# Patient Record
Sex: MALE | Race: WHITE | Employment: FULL TIME | URBAN - METROPOLITAN AREA
[De-identification: names, ages, dates, MRNs, and addresses within clinical notes are randomized per-mention and may not be internally consistent; named-entity substitution may affect disease eponyms.]

---

## 2017-01-31 RX ORDER — TRIAMTERENE AND HYDROCHLOROTHIAZIDE 37.5; 25 MG/1; MG/1
CAPSULE ORAL
Qty: 90 CAPSULE | Refills: 0 | Status: SHIPPED | OUTPATIENT
Start: 2017-01-31 | End: 2017-05-01

## 2017-01-31 RX ORDER — OMEPRAZOLE 20 MG/1
CAPSULE, DELAYED RELEASE ORAL
Qty: 90 CAPSULE | Refills: 0 | Status: SHIPPED | OUTPATIENT
Start: 2017-01-31 | End: 2017-05-01

## 2017-01-31 RX ORDER — AMLODIPINE AND VALSARTAN 10; 320 MG/1; MG/1
TABLET ORAL
Qty: 90 TABLET | Refills: 0 | Status: SHIPPED | OUTPATIENT
Start: 2017-01-31 | End: 2017-05-01

## 2017-01-31 RX ORDER — ATORVASTATIN CALCIUM 20 MG/1
TABLET, FILM COATED ORAL
Qty: 90 TABLET | Refills: 0 | Status: SHIPPED | OUTPATIENT
Start: 2017-01-31 | End: 2017-07-12

## 2017-01-31 RX ORDER — MONTELUKAST SODIUM 10 MG/1
TABLET ORAL
Qty: 90 TABLET | Refills: 0 | Status: SHIPPED | OUTPATIENT
Start: 2017-01-31 | End: 2017-05-01

## 2017-01-31 NOTE — TELEPHONE ENCOUNTER
Medication: Celexa    Date of last refill: 10/26/16  [place]    Last office visit: 11/24/15  Due back to clinic per last office note:  3 months  Date next office visit scheduled:  None Scheduled.     Last OV note recom

## 2017-02-06 RX ORDER — CITALOPRAM 20 MG/1
TABLET ORAL
Qty: 30 TABLET | Refills: 0 | OUTPATIENT
Start: 2017-02-06

## 2017-02-06 NOTE — TELEPHONE ENCOUNTER
Left message for patient to give us a call back. Please help patient make an appointment. Then we can refill medication to the appointment date. Patient hasn't been seen in over a year. Patient needs to make appointment to receive refills.

## 2017-03-31 DIAGNOSIS — G47.33 OSA (OBSTRUCTIVE SLEEP APNEA): Primary | Chronic | ICD-10-CM

## 2017-03-31 RX ORDER — CITALOPRAM 20 MG/1
20 TABLET ORAL DAILY
Qty: 90 TABLET | Refills: 0 | Status: SHIPPED | OUTPATIENT
Start: 2017-03-31 | End: 2017-05-01

## 2017-03-31 NOTE — TELEPHONE ENCOUNTER
Per TE from 01/31/17:    \"Medication: Celexa    Date of last refill: 10/26/16  [place]    Last office visit: 11/24/15  Due back to clinic per last office note:  3 months  Date next office visit scheduled:  None Sched

## 2017-05-01 ENCOUNTER — OFFICE VISIT (OUTPATIENT)
Dept: NEUROLOGY | Facility: CLINIC | Age: 37
End: 2017-05-01

## 2017-05-01 VITALS
WEIGHT: 315 LBS | HEIGHT: 71 IN | DIASTOLIC BLOOD PRESSURE: 86 MMHG | BODY MASS INDEX: 44.1 KG/M2 | SYSTOLIC BLOOD PRESSURE: 110 MMHG | RESPIRATION RATE: 16 BRPM | HEART RATE: 96 BPM

## 2017-05-01 DIAGNOSIS — H53.9 VISUAL CHANGES: ICD-10-CM

## 2017-05-01 DIAGNOSIS — H53.469 HOMONYMOUS HEMIANOPSIA DUE TO OLD CEREBRAL INFARCTION: ICD-10-CM

## 2017-05-01 DIAGNOSIS — I69.398 HOMONYMOUS HEMIANOPSIA DUE TO OLD CEREBRAL INFARCTION: ICD-10-CM

## 2017-05-01 DIAGNOSIS — F41.9 ANXIETY: Chronic | ICD-10-CM

## 2017-05-01 DIAGNOSIS — E66.3 OVERWEIGHT(278.02): Chronic | ICD-10-CM

## 2017-05-01 DIAGNOSIS — I63.9 STROKE WITH CEREBRAL ISCHEMIA (HCC): ICD-10-CM

## 2017-05-01 DIAGNOSIS — G47.33 OSA (OBSTRUCTIVE SLEEP APNEA): Primary | Chronic | ICD-10-CM

## 2017-05-01 DIAGNOSIS — I72.0 ANEURYSM ARTERY, NECK (HCC): ICD-10-CM

## 2017-05-01 PROCEDURE — 99214 OFFICE O/P EST MOD 30 MIN: CPT | Performed by: OTHER

## 2017-05-01 RX ORDER — CITALOPRAM 20 MG/1
20 TABLET ORAL DAILY
Qty: 90 TABLET | Refills: 0 | Status: SHIPPED | OUTPATIENT
Start: 2017-05-01 | End: 2017-05-01

## 2017-05-01 RX ORDER — CITALOPRAM 20 MG/1
20 TABLET ORAL DAILY
Qty: 90 TABLET | Refills: 3 | Status: SHIPPED | OUTPATIENT
Start: 2017-05-01 | End: 2017-07-19

## 2017-05-01 RX ORDER — AMLODIPINE AND VALSARTAN 10; 320 MG/1; MG/1
TABLET ORAL
Qty: 90 TABLET | Refills: 0 | Status: SHIPPED | OUTPATIENT
Start: 2017-05-01 | End: 2017-11-06

## 2017-05-01 RX ORDER — MONTELUKAST SODIUM 10 MG/1
TABLET ORAL
Qty: 90 TABLET | Refills: 0 | Status: SHIPPED | OUTPATIENT
Start: 2017-05-01 | End: 2017-07-12

## 2017-05-01 RX ORDER — TRIAMTERENE AND HYDROCHLOROTHIAZIDE 37.5; 25 MG/1; MG/1
CAPSULE ORAL
Qty: 90 CAPSULE | Refills: 0 | Status: SHIPPED | OUTPATIENT
Start: 2017-05-01 | End: 2017-07-12

## 2017-05-01 RX ORDER — OMEPRAZOLE 20 MG/1
CAPSULE, DELAYED RELEASE ORAL
Qty: 90 CAPSULE | Refills: 0 | Status: SHIPPED | OUTPATIENT
Start: 2017-05-01 | End: 2017-07-12

## 2017-05-01 NOTE — PATIENT INSTRUCTIONS
Refill policies:    • Allow 2 business days for refills; controlled substances may take longer.   • Contact your pharmacy at least 5 days prior to running out of medication and have them send an electronic request or submit request through the “request re insurance carrier to obtain pre-certification or prior authorization. Unfortunately, [employer] has seen an increase in denial of payment even though the procedure/test has been pre-certified.   You are strongly encouraged to contact your insurance carrier to v

## 2017-05-01 NOTE — PROGRESS NOTES
[facility] in [place] with [facility]  Neurology - Clinic visit  2017    Arely Reyes Patient Status:  No patient class for patient encounter    1980 MRN [id number]   Location @ Rfl: 0   aspirin 325 MG Oral Tab Take 325 mg by mouth daily. Disp:  Rfl:    Albuterol Sulfate HFA (VENTOLIN) 108 (90 BASE) MCG/ACT Inhalation Aero Soln Inhale 2 puffs into the lungs every 4 (four) hours as needed.  Disp:  Rfl:    AMLODIPINE BESYLATE-VALSART XII:  Showed  visual field cut on the L side with  Homonymous L. Lower quadrianopsia,, EOMI, pupil symmetrical, light reflexes are present, fundus exam is not clearly visualized.  Face symmetrical with normal sensation, hearing normal, shoulder shrugging no hemianopsia, unchanged,   I reviewed film with him and all the test reviewed, details of discussion of risk factors control was discussed today,   Continue risk factor treatment, weight control,  mg qd PO.     he need MADELAINE.  I ordered last year, he di

## 2017-06-19 ENCOUNTER — HOSPITAL ENCOUNTER (EMERGENCY)
Facility: HOSPITAL | Age: 37
Discharge: HOME | End: 2017-06-19
Attending: EMERGENCY MEDICINE
Payer: COMMERCIAL

## 2017-06-19 ENCOUNTER — APPOINTMENT (OUTPATIENT)
Dept: GENERAL RADIOLOGY | Facility: HOSPITAL | Age: 37
End: 2017-06-19
Payer: COMMERCIAL

## 2017-06-19 VITALS
SYSTOLIC BLOOD PRESSURE: 115 MMHG | RESPIRATION RATE: 16 BRPM | DIASTOLIC BLOOD PRESSURE: 73 MMHG | HEIGHT: 71 IN | OXYGEN SATURATION: 100 % | TEMPERATURE: 98 F | BODY MASS INDEX: 44.1 KG/M2 | WEIGHT: 315 LBS | HEART RATE: 78 BPM

## 2017-06-19 DIAGNOSIS — M54.9 BACK PAIN WITHOUT RADIATION: Primary | ICD-10-CM

## 2017-06-19 PROCEDURE — 99283 EMERGENCY DEPT VISIT LOW MDM: CPT

## 2017-06-19 PROCEDURE — 96372 THER/PROPH/DIAG INJ SC/IM: CPT

## 2017-06-19 PROCEDURE — 72100 X-RAY EXAM L-S SPINE 2/3 VWS: CPT | Performed by: EMERGENCY MEDICINE

## 2017-06-19 RX ORDER — KETOROLAC TROMETHAMINE 30 MG/ML
60 INJECTION, SOLUTION INTRAMUSCULAR; INTRAVENOUS ONCE
Status: COMPLETED | OUTPATIENT
Start: 2017-06-19 | End: 2017-06-19

## 2017-06-19 RX ORDER — OXYCODONE AND ACETAMINOPHEN 10; 325 MG/1; MG/1
1 TABLET ORAL EVERY 4 HOURS PRN
Qty: 20 TABLET | Refills: 0 | Status: SHIPPED | OUTPATIENT
Start: 2017-06-19 | End: 2017-06-22

## 2017-06-19 NOTE — ED INITIAL ASSESSMENT (HPI)
Patient with lower back pain since Saturday. He went to sit down and felt extreme pain and stood up right away. He currently has pain when he tries to stand up. The pain does not radiate.

## 2017-06-19 NOTE — ED PROVIDER NOTES
Patient Seen in: [facility] Emergency Department    History   Patient presents with:  Back Pain (musculoskeletal)    Stated Complaint: back pain,     HPI    Patient presents with back pain.   The patient states that he has had back trouble off and on Father    • Hypertension Father    • Hypertension Mother    • Heart Disease Mother      CAD   • Hypertension Sister          Smoking Status: Former Smoker                   Packs/Day: 0.00  Years:           Quit date: 01/01/2001    Smokeless Status: Osmany Plata has pain when he tries  to stand up. The pain does not radiate. FINDINGS:    BONES:  Mild straightening of the lumbar lordosis. No significant spondylosis, scoliosis, fracture, or visible bony lesion.  DISC SPACES:  No significant disc narrowing tiny en

## 2017-06-21 ENCOUNTER — TELEPHONE (OUTPATIENT)
Dept: INTERNAL MEDICINE CLINIC | Facility: CLINIC | Age: 37
End: 2017-06-21

## 2017-06-21 NOTE — TELEPHONE ENCOUNTER
Future Appointments  Date Time Provider Baljit Priyanka   6/22/2017 1:45 PM Katiana Meier MD [facility] [place]

## 2017-06-21 NOTE — TELEPHONE ENCOUNTER
Patient called stating that he was in the emergency room for back pain.  He was instructed to contact PCP office and request order for MRI  Please call with update

## 2017-06-21 NOTE — TELEPHONE ENCOUNTER
Reviewed er notes  He needs to be seen prior to ordering MR  moreover documentation is necessary to get mr done  Er note does not say anything about mr

## 2017-06-22 ENCOUNTER — OFFICE VISIT (OUTPATIENT)
Dept: INTERNAL MEDICINE CLINIC | Facility: CLINIC | Age: 37
End: 2017-06-22

## 2017-06-22 VITALS
TEMPERATURE: 100 F | RESPIRATION RATE: 20 BRPM | WEIGHT: 315 LBS | SYSTOLIC BLOOD PRESSURE: 126 MMHG | BODY MASS INDEX: 42.66 KG/M2 | OXYGEN SATURATION: 99 % | DIASTOLIC BLOOD PRESSURE: 62 MMHG | HEIGHT: 72 IN | HEART RATE: 106 BPM

## 2017-06-22 DIAGNOSIS — M54.5 ACUTE MIDLINE LOW BACK PAIN, WITH SCIATICA PRESENCE UNSPECIFIED: Primary | ICD-10-CM

## 2017-06-22 PROCEDURE — 99214 OFFICE O/P EST MOD 30 MIN: CPT | Performed by: INTERNAL MEDICINE

## 2017-06-22 RX ORDER — OXYCODONE AND ACETAMINOPHEN 10; 325 MG/1; MG/1
1 TABLET ORAL EVERY 4 HOURS PRN
Qty: 50 TABLET | Refills: 0 | Status: SHIPPED | OUTPATIENT
Start: 2017-06-22 | End: 2017-07-02

## 2017-06-22 NOTE — PATIENT INSTRUCTIONS
Patient given brochure with exercise for neck ,shoulders and lumbar spine. Back Pain [Acute Or Chronic]    Back pain is usually caused by an injury to the muscles or ligaments of the spine.  Sometimes the disks that separate each bone in the spine may bulg 4. During the first two days after injury, apply an ICE PACK to the painful area for 20 minutes every 2-4 hours. This will reduce swelling and pain. HEAT (hot shower, hot bath or heating pad) works well for muscle spasm.  You can start with ice, then switch

## 2017-06-22 NOTE — PROGRESS NOTES
Rejicarolina Jurist BECERRIL 1980 is a 42year old male.     Patient presents with:  Back Pain: Started on       HPI:   C/o of low back pain for few days   Follow-up ER some better    Current Outpatient Prescriptions:  oxyCODONE-acetaminophen  MG Oral none.   Fall none. Direct trauma none. Tingling/numbness none. Previous injury none. Previous surgery none. Previous therapy none. Bowel and bladder incontinence none.    Pain scale 8/10 when moving  Increased by movement  No definite definite p FROM.   L-S spines: FROM  forward flexion 60--extension 15--lat bending 25 with spasm        ASSESSMENT AND PLAN:   There are no diagnoses linked to this encounter. There are no Patient Instructions on file for this visit.    The patient indicates under

## 2017-06-29 RX ORDER — AMLODIPINE AND VALSARTAN 10; 320 MG/1; MG/1
TABLET ORAL
Qty: 30 TABLET | Refills: 0 | Status: SHIPPED | OUTPATIENT
Start: 2017-06-29 | End: 2017-07-19

## 2017-07-12 NOTE — TELEPHONE ENCOUNTER
Pt called triage line and LM for nurse stating he was in need of refills, but did not mention which medications needed refills. I called pt back and LMTCB. Awaiting call to assist pt.

## 2017-07-12 NOTE — TELEPHONE ENCOUNTER
Pharmacy has changed pharmacy to [facility] order. [facility].   Pt states has enough meds to last approximately 1 week before needing to fill    ATORVASTATIN CALCIUM 20 MG   TRIAMTERENE-HCTZ 37.5-25 MG   OMEPRAZOLE 20 MG Oral Cap

## 2017-07-13 ENCOUNTER — TELEPHONE (OUTPATIENT)
Dept: INTERNAL MEDICINE CLINIC | Facility: CLINIC | Age: 37
End: 2017-07-13

## 2017-07-13 DIAGNOSIS — I10 ESSENTIAL HYPERTENSION: Chronic | ICD-10-CM

## 2017-07-13 DIAGNOSIS — Z00.00 BLOOD TESTS FOR ROUTINE GENERAL PHYSICAL EXAMINATION: Primary | ICD-10-CM

## 2017-07-13 DIAGNOSIS — E11.9 TYPE 2 DIABETES MELLITUS WITHOUT COMPLICATION, UNSPECIFIED LONG TERM INSULIN USE STATUS: ICD-10-CM

## 2017-07-13 RX ORDER — OMEPRAZOLE 20 MG/1
CAPSULE, DELAYED RELEASE ORAL
Qty: 90 CAPSULE | Refills: 0 | Status: SHIPPED | OUTPATIENT
Start: 2017-07-13 | End: 2017-10-28

## 2017-07-13 RX ORDER — MONTELUKAST SODIUM 10 MG/1
TABLET ORAL
Qty: 90 TABLET | Refills: 0 | Status: SHIPPED | OUTPATIENT
Start: 2017-07-13 | End: 2017-10-28

## 2017-07-13 RX ORDER — ATORVASTATIN CALCIUM 20 MG/1
TABLET, FILM COATED ORAL
Qty: 90 TABLET | Refills: 0 | Status: SHIPPED | OUTPATIENT
Start: 2017-07-13 | End: 2017-10-28

## 2017-07-13 RX ORDER — TRIAMTERENE AND HYDROCHLOROTHIAZIDE 37.5; 25 MG/1; MG/1
CAPSULE ORAL
Qty: 90 CAPSULE | Refills: 0 | Status: SHIPPED | OUTPATIENT
Start: 2017-07-13 | End: 2017-10-28

## 2017-07-19 NOTE — TELEPHONE ENCOUNTER
Pt does not meet refill protocol for amlodipine/valsartan  since he is due for cmp[/bmp.     Medication pending, ok to refill?

## 2017-07-19 NOTE — TELEPHONE ENCOUNTER
Received fax from [facility] order--new pharmacy for patient.   Medication already approved for 1 year on 05/01/17    Citalopram 20 mg  Refilled 05/1/17 #90 with 3 refills    Dr. Eagle Sanders notes 5/1/17  He has R. CVA, in R.  occipital or temporal stroke:

## 2017-07-20 RX ORDER — AMLODIPINE AND VALSARTAN 10; 320 MG/1; MG/1
1 TABLET ORAL
Qty: 90 TABLET | Refills: 0 | Status: SHIPPED | OUTPATIENT
Start: 2017-07-20 | End: 2017-10-28

## 2017-10-28 DIAGNOSIS — I10 ESSENTIAL HYPERTENSION: Chronic | ICD-10-CM

## 2017-10-28 DIAGNOSIS — Z00.00 BLOOD TESTS FOR ROUTINE GENERAL PHYSICAL EXAMINATION: Primary | ICD-10-CM

## 2017-10-28 DIAGNOSIS — E11.9 TYPE 2 DIABETES MELLITUS WITHOUT COMPLICATION, UNSPECIFIED LONG TERM INSULIN USE STATUS: ICD-10-CM

## 2017-10-28 DIAGNOSIS — E78.00 PURE HYPERCHOLESTEROLEMIA: Chronic | ICD-10-CM

## 2017-10-30 NOTE — TELEPHONE ENCOUNTER
Pt needs CPX as well as blood work. Called pt and left voicemail to call back so we can fill RX as soon as possible. Lab work is ordered already.

## 2017-10-31 ENCOUNTER — TELEPHONE (OUTPATIENT)
Dept: INTERNAL MEDICINE CLINIC | Facility: CLINIC | Age: 37
End: 2017-10-31

## 2017-10-31 RX ORDER — AMLODIPINE AND VALSARTAN 10; 320 MG/1; MG/1
1 TABLET ORAL
Qty: 30 TABLET | Refills: 0 | Status: SHIPPED | OUTPATIENT
Start: 2017-10-31 | End: 2017-11-06

## 2017-10-31 RX ORDER — ATORVASTATIN CALCIUM 20 MG/1
TABLET, FILM COATED ORAL
Qty: 30 TABLET | Refills: 0 | Status: SHIPPED | OUTPATIENT
Start: 2017-10-31 | End: 2017-11-06

## 2017-10-31 RX ORDER — TRIAMTERENE AND HYDROCHLOROTHIAZIDE 37.5; 25 MG/1; MG/1
CAPSULE ORAL
Qty: 30 CAPSULE | Refills: 0 | Status: SHIPPED | OUTPATIENT
Start: 2017-10-31 | End: 2017-11-06 | Stop reason: ALTCHOICE

## 2017-10-31 RX ORDER — OMEPRAZOLE 20 MG/1
CAPSULE, DELAYED RELEASE ORAL
Qty: 30 CAPSULE | Refills: 0 | Status: SHIPPED | OUTPATIENT
Start: 2017-10-31 | End: 2018-06-15

## 2017-10-31 RX ORDER — MONTELUKAST SODIUM 10 MG/1
TABLET ORAL
Qty: 30 TABLET | Refills: 0 | Status: SHIPPED | OUTPATIENT
Start: 2017-10-31 | End: 2017-11-06

## 2017-10-31 NOTE — TELEPHONE ENCOUNTER
PT returned call from yesterday and today about scheduling CPX and getting blood work done as require for physical and per protocol in order to refill RXs. Pt did schedule however, began to yell at me stating we were holding his medications from him.  I did

## 2017-10-31 NOTE — TELEPHONE ENCOUNTER
Per protocol pt scheduled appt, and will complete blood work prior to appt. 30 day supply given and sent to [facility]. See additional TE for more information.

## 2017-11-03 ENCOUNTER — LAB ENCOUNTER (OUTPATIENT)
Dept: LAB | Age: 37
End: 2017-11-03
Attending: INTERNAL MEDICINE
Payer: COMMERCIAL

## 2017-11-03 DIAGNOSIS — E11.9 TYPE 2 DIABETES MELLITUS WITHOUT COMPLICATION, UNSPECIFIED LONG TERM INSULIN USE STATUS: ICD-10-CM

## 2017-11-03 DIAGNOSIS — E78.00 PURE HYPERCHOLESTEROLEMIA: Chronic | ICD-10-CM

## 2017-11-03 DIAGNOSIS — N20.0 KIDNEY STONES: ICD-10-CM

## 2017-11-03 DIAGNOSIS — I10 ESSENTIAL HYPERTENSION: ICD-10-CM

## 2017-11-03 DIAGNOSIS — Z00.00 BLOOD TESTS FOR ROUTINE GENERAL PHYSICAL EXAMINATION: ICD-10-CM

## 2017-11-03 PROCEDURE — 83036 HEMOGLOBIN GLYCOSYLATED A1C: CPT | Performed by: INTERNAL MEDICINE

## 2017-11-03 PROCEDURE — 84436 ASSAY OF TOTAL THYROXINE: CPT | Performed by: INTERNAL MEDICINE

## 2017-11-03 PROCEDURE — 36415 COLL VENOUS BLD VENIPUNCTURE: CPT | Performed by: INTERNAL MEDICINE

## 2017-11-03 PROCEDURE — 82570 ASSAY OF URINE CREATININE: CPT | Performed by: INTERNAL MEDICINE

## 2017-11-03 PROCEDURE — 82043 UR ALBUMIN QUANTITATIVE: CPT | Performed by: INTERNAL MEDICINE

## 2017-11-03 PROCEDURE — 81003 URINALYSIS AUTO W/O SCOPE: CPT | Performed by: INTERNAL MEDICINE

## 2017-11-03 PROCEDURE — 84550 ASSAY OF BLOOD/URIC ACID: CPT | Performed by: INTERNAL MEDICINE

## 2017-11-03 PROCEDURE — 80050 GENERAL HEALTH PANEL: CPT | Performed by: INTERNAL MEDICINE

## 2017-11-03 PROCEDURE — 80061 LIPID PANEL: CPT | Performed by: INTERNAL MEDICINE

## 2017-11-06 ENCOUNTER — OFFICE VISIT (OUTPATIENT)
Dept: INTERNAL MEDICINE CLINIC | Facility: CLINIC | Age: 37
End: 2017-11-06

## 2017-11-06 VITALS
HEIGHT: 71 IN | HEART RATE: 84 BPM | TEMPERATURE: 100 F | BODY MASS INDEX: 44.1 KG/M2 | SYSTOLIC BLOOD PRESSURE: 110 MMHG | OXYGEN SATURATION: 95 % | DIASTOLIC BLOOD PRESSURE: 84 MMHG | WEIGHT: 315 LBS | RESPIRATION RATE: 16 BRPM

## 2017-11-06 DIAGNOSIS — G44.219 EPISODIC TENSION-TYPE HEADACHE, NOT INTRACTABLE: Chronic | ICD-10-CM

## 2017-11-06 DIAGNOSIS — R74.8 ABNORMAL LEVELS OF OTHER SERUM ENZYMES: ICD-10-CM

## 2017-11-06 DIAGNOSIS — E11.9 TYPE 2 DIABETES MELLITUS WITHOUT COMPLICATION, UNSPECIFIED LONG TERM INSULIN USE STATUS: Primary | ICD-10-CM

## 2017-11-06 DIAGNOSIS — I10 ESSENTIAL HYPERTENSION: Chronic | ICD-10-CM

## 2017-11-06 PROCEDURE — 99214 OFFICE O/P EST MOD 30 MIN: CPT | Performed by: INTERNAL MEDICINE

## 2017-11-06 RX ORDER — MONTELUKAST SODIUM 10 MG/1
10 TABLET ORAL
Qty: 90 TABLET | Refills: 3 | Status: SHIPPED | OUTPATIENT
Start: 2017-11-06 | End: 2018-09-16

## 2017-11-06 RX ORDER — ATORVASTATIN CALCIUM 20 MG/1
20 TABLET, FILM COATED ORAL DAILY
Qty: 90 TABLET | Refills: 3 | Status: SHIPPED | OUTPATIENT
Start: 2017-11-06 | End: 2018-09-16

## 2017-11-06 RX ORDER — OMEPRAZOLE 20 MG/1
20 CAPSULE, DELAYED RELEASE ORAL
Qty: 30 CAPSULE | Refills: 0 | Status: CANCELLED | OUTPATIENT
Start: 2017-11-06

## 2017-11-06 RX ORDER — AMLODIPINE AND VALSARTAN 10; 320 MG/1; MG/1
TABLET ORAL
Qty: 90 TABLET | Refills: 3 | Status: SHIPPED | OUTPATIENT
Start: 2017-11-06 | End: 2018-09-16

## 2017-11-06 NOTE — PROGRESS NOTES
Pete Hanks  1980 is a 42year old male.     Patient presents with:  Physical: ESt Pt complete physical      HPI:   DIABETES MELLITUS:   The diet that the patient has been following is none  The frequency of the monitoring schedule is none  The res 10-12 drinks/weekends-not for past few               weeks       REVIEW OF SYSTEMS:     General/Constitutional:   Weight loss  Yes   Ophthalmologic:   Change in vision none. Diminished vision no. Double vision no. Vision loss no.   Endocrine:   Excessive sw enzymes    Other orders  -     Cancel: omeprazole 20 MG Oral Capsule Delayed Release; Take 1 capsule (20 mg total) by mouth once daily.  -     Montelukast Sodium 10 MG Oral Tab; Take 1 tablet (10 mg total) by mouth once daily.          Liver enzymes most li

## 2017-11-18 ENCOUNTER — TELEPHONE (OUTPATIENT)
Dept: INTERNAL MEDICINE CLINIC | Facility: CLINIC | Age: 37
End: 2017-11-18

## 2017-12-04 RX ORDER — TRIAMTERENE AND HYDROCHLOROTHIAZIDE 37.5; 25 MG/1; MG/1
CAPSULE ORAL
Qty: 90 CAPSULE | Refills: 0 | OUTPATIENT
Start: 2017-12-04

## 2017-12-04 RX ORDER — OMEPRAZOLE 20 MG/1
CAPSULE, DELAYED RELEASE ORAL
Qty: 90 CAPSULE | Refills: 0 | OUTPATIENT
Start: 2017-12-04

## 2017-12-28 ENCOUNTER — TELEPHONE (OUTPATIENT)
Dept: INTERNAL MEDICINE CLINIC | Facility: CLINIC | Age: 37
End: 2017-12-28

## 2017-12-28 RX ORDER — TRIAMTERENE AND HYDROCHLOROTHIAZIDE 37.5; 25 MG/1; MG/1
CAPSULE ORAL
Qty: 90 CAPSULE | Refills: 0 | Status: SHIPPED | OUTPATIENT
Start: 2017-12-28 | End: 2018-06-15

## 2017-12-28 RX ORDER — OMEPRAZOLE 20 MG/1
CAPSULE, DELAYED RELEASE ORAL
Qty: 90 CAPSULE | Refills: 0 | Status: SHIPPED | OUTPATIENT
Start: 2017-12-28 | End: 2018-03-29

## 2017-12-28 NOTE — TELEPHONE ENCOUNTER
Pt called for status of his refill . D/t numourous issues with customer service a request was made to [facility] supervisor for her to contact pt.

## 2017-12-29 NOTE — TELEPHONE ENCOUNTER
Triamterene passed protocol. Requesting Triamterene-hctz 37.5-25mg   And   omeprazole  LOV: 11/6/17  RTC: 1 week  Last Relevant Labs: 11/3/17  Filled: Triamterene last sent as 30 with no refills on 10/31/17.     Omeprazole - last sent as 30 with no refi

## 2017-12-29 NOTE — TELEPHONE ENCOUNTER
Spoke with pt on Thursday 12/28/17 at approximately 6:15pm.   Pt asking about his refills for the triamterene - hctz and the omeprazole. See refill request from 12/25/17 - refills addressed.      Clinic error made on 11/6/17 at pt's OV and med was accid

## 2018-01-02 ENCOUNTER — CHARTING TRANS (OUTPATIENT)
Dept: OTHER | Age: 38
End: 2018-01-02

## 2018-01-02 ENCOUNTER — LAB SERVICES (OUTPATIENT)
Dept: OTHER | Age: 38
End: 2018-01-02

## 2018-01-02 LAB — RAPID STREP GROUP A: NORMAL

## 2018-03-21 ENCOUNTER — APPOINTMENT (OUTPATIENT)
Dept: CT IMAGING | Age: 38
End: 2018-03-21
Attending: FAMILY MEDICINE
Payer: COMMERCIAL

## 2018-03-21 ENCOUNTER — HOSPITAL ENCOUNTER (OUTPATIENT)
Age: 38
Discharge: HOME | End: 2018-03-21
Attending: FAMILY MEDICINE
Payer: COMMERCIAL

## 2018-03-21 VITALS
OXYGEN SATURATION: 95 % | TEMPERATURE: 99 F | HEART RATE: 90 BPM | RESPIRATION RATE: 16 BRPM | DIASTOLIC BLOOD PRESSURE: 82 MMHG | SYSTOLIC BLOOD PRESSURE: 128 MMHG

## 2018-03-21 DIAGNOSIS — N20.0 NEPHROLITHIASIS: Primary | ICD-10-CM

## 2018-03-21 LAB
POCT BILIRUBIN URINE: NEGATIVE
POCT GLUCOSE URINE: NEGATIVE MG/DL
POCT KETONE URINE: NEGATIVE MG/DL
POCT LEUKOCYTE ESTERASE URINE: NEGATIVE
POCT NITRITE URINE: NEGATIVE
POCT PH URINE: 7.5 (ref 5–8)
POCT SPECIFIC GRAVITY URINE: 1.02
POCT UROBILINOGEN URINE: 0.2 MG/DL

## 2018-03-21 PROCEDURE — 81002 URINALYSIS NONAUTO W/O SCOPE: CPT | Performed by: FAMILY MEDICINE

## 2018-03-21 PROCEDURE — 74176 CT ABD & PELVIS W/O CONTRAST: CPT | Performed by: FAMILY MEDICINE

## 2018-03-21 PROCEDURE — 99214 OFFICE O/P EST MOD 30 MIN: CPT

## 2018-03-21 RX ORDER — HYDROCODONE BITARTRATE AND ACETAMINOPHEN 10; 325 MG/1; MG/1
1-2 TABLET ORAL EVERY 4 HOURS PRN
Qty: 20 TABLET | Refills: 0 | Status: SHIPPED | OUTPATIENT
Start: 2018-03-21 | End: 2018-03-28

## 2018-03-21 RX ORDER — ONDANSETRON 4 MG/1
4 TABLET, ORALLY DISINTEGRATING ORAL EVERY 4 HOURS PRN
Qty: 10 TABLET | Refills: 0 | Status: SHIPPED | OUTPATIENT
Start: 2018-03-21 | End: 2018-03-28

## 2018-03-21 RX ORDER — NAPROXEN 500 MG/1
500 TABLET ORAL 2 TIMES DAILY PRN
Qty: 20 TABLET | Refills: 0 | Status: SHIPPED | OUTPATIENT
Start: 2018-03-21 | End: 2018-03-28

## 2018-03-21 NOTE — ED PROVIDER NOTES
Patient Seen in: [facility]    History   Patient presents with:  Urinary Symptoms (urologic)    Stated Complaint: blood in urine / back issues    HPI    64-year-old gentleman with a history of hypertension, GERD, and nephrolithi McBurney's, Neg Rovsing, Neg Obturator, Neg Psoas, Neg Heel Strike. No CVAT to percussion Can  Neuro: A&O x3. No focal deficits. Vascular: Peripheral pulses intact.   No edema    ED Course     Labs Reviewed   POCT URINALYSIS DIPSTICK - Abnormal; Notable fo am    Follow-up:  Ralph Pitts MD  [address], [facility] [address]  [address] [facility] [phone]    Call   As needed    Shad Atwood MD  [address] [place] [address] [address].  [phone]    Schedule an appointment as soon as possible for a

## 2018-03-21 NOTE — ED INITIAL ASSESSMENT (HPI)
Pt with hx of kidney stones  Noted blood in urine this morning. Some urgency. Possible vague tenderness left flank.

## 2018-03-29 PROCEDURE — 87086 URINE CULTURE/COLONY COUNT: CPT | Performed by: PHYSICIAN ASSISTANT

## 2018-03-29 PROCEDURE — 88108 CYTOPATH CONCENTRATE TECH: CPT | Performed by: PHYSICIAN ASSISTANT

## 2018-04-02 ENCOUNTER — LAB ENCOUNTER (OUTPATIENT)
Dept: LAB | Age: 38
End: 2018-04-02
Attending: PHYSICIAN ASSISTANT
Payer: COMMERCIAL

## 2018-04-02 DIAGNOSIS — R31.0 GROSS HEMATURIA: ICD-10-CM

## 2018-04-02 PROCEDURE — 36415 COLL VENOUS BLD VENIPUNCTURE: CPT

## 2018-04-02 PROCEDURE — 84520 ASSAY OF UREA NITROGEN: CPT

## 2018-04-02 PROCEDURE — 82565 ASSAY OF CREATININE: CPT

## 2018-04-02 NOTE — PROGRESS NOTES
Cr wnl  Okay to proceed with CT    4/3/2018   3:40 PM    [facility] PET CT/CT          RCK[facility]          [facility] AT [facility]  4/16/2018  9:30 AM    Nimisha Hernandez MD          NPV [facility] NAMAN Larson

## 2018-04-06 NOTE — TELEPHONE ENCOUNTER
I have called and spoke to the patient. He state doctor has prescribed this to him years ago for exercise dose asthma. Patient state that he is going to [facility] and is concern of the high altitude and my have difficult ing breathing.  Patient was

## 2018-04-06 NOTE — TELEPHONE ENCOUNTER
Pt is going out of town tomorrow and would like a refill on his inhaler dose not know what its called and needs it to be refilled today  [address], [address] [facility] 6, [phone], [phone]

## 2018-04-07 RX ORDER — ALBUTEROL SULFATE 90 UG/1
2 AEROSOL, METERED RESPIRATORY (INHALATION) EVERY 4 HOURS PRN
Qty: 1 INHALER | Refills: 1 | Status: SHIPPED | OUTPATIENT
Start: 2018-04-07 | End: 2019-01-03

## 2018-05-18 DIAGNOSIS — F41.9 ANXIETY: Chronic | ICD-10-CM

## 2018-05-18 DIAGNOSIS — G47.33 OSA (OBSTRUCTIVE SLEEP APNEA): Chronic | ICD-10-CM

## 2018-05-18 NOTE — TELEPHONE ENCOUNTER
Left message on patient identified voicemail (ok with [employer]IP[employer] consent) informing patient  Needs appointment for further refills.   .  Medication: Celexa    Date of last refill: 7/9/17  [place]    Last office visit: 5/1/1

## 2018-05-21 NOTE — TELEPHONE ENCOUNTER
Meghana Pitts at pharmacy calling for update on Rx. Informed her that we are waiting on call back from pt to set up appt. Verbalized understanding.

## 2018-05-23 ENCOUNTER — HOSPITAL ENCOUNTER (OUTPATIENT)
Age: 38
Discharge: HOME | End: 2018-05-23
Payer: COMMERCIAL

## 2018-05-23 VITALS
TEMPERATURE: 98 F | OXYGEN SATURATION: 97 % | RESPIRATION RATE: 16 BRPM | SYSTOLIC BLOOD PRESSURE: 140 MMHG | HEART RATE: 98 BPM | DIASTOLIC BLOOD PRESSURE: 90 MMHG

## 2018-05-23 DIAGNOSIS — H60.311 ACUTE DIFFUSE OTITIS EXTERNA OF RIGHT EAR: Primary | ICD-10-CM

## 2018-05-23 PROCEDURE — 99214 OFFICE O/P EST MOD 30 MIN: CPT

## 2018-05-23 PROCEDURE — 99213 OFFICE O/P EST LOW 20 MIN: CPT

## 2018-05-23 RX ORDER — NEOMYCIN SULFATE, POLYMYXIN B SULFATE AND HYDROCORTISONE 10; 3.5; 1 MG/ML; MG/ML; [USP'U]/ML
3 SUSPENSION/ DROPS AURICULAR (OTIC) 3 TIMES DAILY
Qty: 1 BOTTLE | Refills: 0 | Status: SHIPPED | OUTPATIENT
Start: 2018-05-23 | End: 2018-05-30

## 2018-05-24 NOTE — ED PROVIDER NOTES
Patient Seen in: [facility] In [facility]    History   Patient presents with:  Ear Pain    Stated Complaint: EAR PAIN    78-year-old male who presents to the immediate care with complaints of right ear pain for the past couple days.   Patient stated complaint: EAR PAIN  Other systems are as noted in HPI. Constitutional and vital signs reviewed. All other systems reviewed and negative except as noted above.     Physical Exam   ED Triage Vitals [05/23/18 2008]  BP: 140/90  Pulse: 98  Resp: 1  discharge instructions and plan. I answered all of the patient's questions prior to discharge.              Disposition and Plan     Clinical Impression:  Acute diffuse otitis externa of right ear  (primary encounter diagnosis)    Disposition:  Discharge

## 2018-05-25 ENCOUNTER — OFFICE VISIT (OUTPATIENT)
Dept: INTERNAL MEDICINE CLINIC | Facility: CLINIC | Age: 38
End: 2018-05-25

## 2018-05-25 VITALS
OXYGEN SATURATION: 96 % | BODY MASS INDEX: 56 KG/M2 | RESPIRATION RATE: 16 BRPM | HEART RATE: 82 BPM | WEIGHT: 315 LBS | SYSTOLIC BLOOD PRESSURE: 118 MMHG | DIASTOLIC BLOOD PRESSURE: 80 MMHG

## 2018-05-25 DIAGNOSIS — H66.90 ACUTE OTITIS MEDIA, UNSPECIFIED OTITIS MEDIA TYPE: Primary | ICD-10-CM

## 2018-05-25 DIAGNOSIS — H60.501 ACUTE OTITIS EXTERNA OF RIGHT EAR, UNSPECIFIED TYPE: ICD-10-CM

## 2018-05-25 PROCEDURE — 99213 OFFICE O/P EST LOW 20 MIN: CPT | Performed by: INTERNAL MEDICINE

## 2018-05-25 RX ORDER — HYDROCODONE BITARTRATE AND ACETAMINOPHEN 7.5; 325 MG/1; MG/1
1 TABLET ORAL EVERY 4 HOURS PRN
Qty: 15 TABLET | Refills: 0 | Status: SHIPPED | OUTPATIENT
Start: 2018-05-25 | End: 2018-05-30

## 2018-05-25 RX ORDER — AMOXICILLIN AND CLAVULANATE POTASSIUM 875; 125 MG/1; MG/1
1 TABLET, FILM COATED ORAL 2 TIMES DAILY
Qty: 20 TABLET | Refills: 0 | Status: SHIPPED | OUTPATIENT
Start: 2018-05-25 | End: 2018-06-04

## 2018-05-25 NOTE — PROGRESS NOTES
Ana Luisa Diego  1980 is a 34year old male who presents for upper respiratory symptoms    Patient presents with:  Pain: right ear pain        HPI:   Immediate care. f/u   Still quite tender in the right ear no relief pain scale 8.        Current Outp Packs/day: 0.00      Years: 0.00         Quit date: 1/1/2001  Smokeless tobacco: Current User                       Types: Chew  Comment: -chews tobacco daily  Alcohol use: Yes           0.0 oz/week     Comment: 10-12 drinks/weekends-not fo 5/29/2018). Елена Kingsley MD

## 2018-05-29 RX ORDER — CITALOPRAM 20 MG/1
TABLET ORAL
Qty: 90 TABLET | Refills: 0 | Status: SHIPPED | OUTPATIENT
Start: 2018-05-29 | End: 2018-06-26

## 2018-05-29 NOTE — TELEPHONE ENCOUNTER
Patient scheduled appointment for 6/26/2018 @ 2pm with Cole Moraes. He is on his way out of town and would like the medication called into [facility].   [place], [address]

## 2018-06-15 RX ORDER — OMEPRAZOLE 20 MG/1
20 CAPSULE, DELAYED RELEASE ORAL DAILY
Qty: 90 CAPSULE | Refills: 0 | Status: SHIPPED | OUTPATIENT
Start: 2018-06-15 | End: 2018-09-16

## 2018-06-15 RX ORDER — TRIAMTERENE AND HYDROCHLOROTHIAZIDE 37.5; 25 MG/1; MG/1
1 CAPSULE ORAL EVERY MORNING
Qty: 90 CAPSULE | Refills: 0 | Status: SHIPPED | OUTPATIENT
Start: 2018-06-15 | End: 2018-09-16

## 2018-06-15 NOTE — TELEPHONE ENCOUNTER
Refill requested: Omeprazole 20 mg + Triamterene HCTZ 37.5-25 mg     Failed protocol - omeprazole   Passed protocol - triamterene       Last refill: 12/28.17 Triamterene #90 NR + 10/31/17 Omeprazole 20 mg #30 NR     Relevant Labs: CMP 11/3/17    BP Reading

## 2018-06-26 ENCOUNTER — OFFICE VISIT (OUTPATIENT)
Dept: NEUROLOGY | Facility: CLINIC | Age: 38
End: 2018-06-26

## 2018-06-26 VITALS
SYSTOLIC BLOOD PRESSURE: 110 MMHG | DIASTOLIC BLOOD PRESSURE: 80 MMHG | BODY MASS INDEX: 56 KG/M2 | WEIGHT: 315 LBS | HEART RATE: 96 BPM

## 2018-06-26 DIAGNOSIS — F41.9 ANXIETY: Chronic | ICD-10-CM

## 2018-06-26 DIAGNOSIS — I63.9 STROKE WITH CEREBRAL ISCHEMIA (HCC): Primary | ICD-10-CM

## 2018-06-26 DIAGNOSIS — G47.33 OSA (OBSTRUCTIVE SLEEP APNEA): Chronic | ICD-10-CM

## 2018-06-26 DIAGNOSIS — G44.029 CHRONIC CLUSTER HEADACHE, NOT INTRACTABLE: Chronic | ICD-10-CM

## 2018-06-26 PROCEDURE — 99214 OFFICE O/P EST MOD 30 MIN: CPT | Performed by: OTHER

## 2018-06-26 RX ORDER — CITALOPRAM 20 MG/1
20 TABLET ORAL
Qty: 90 TABLET | Refills: 3 | Status: SHIPPED | OUTPATIENT
Start: 2018-06-26 | End: 2019-01-03

## 2018-06-26 NOTE — PROGRESS NOTES
[facility] in [place] with [facility]  Neurology - Clinic visit  2018    Lawson Sam Patient Status:  No patient class for patient encounter    1980 MRN [id number]   Location @EN 37.5-25 MG Oral Cap Take 1 capsule by mouth every morning. Disp: 90 capsule Rfl: 0   Albuterol Sulfate  (90 Base) MCG/ACT Inhalation Aero Soln Inhale 2 puffs into the lungs every 4 (four) hours as needed.  Disp: 1 Inhaler Rfl: 1   atorvastatin 20 MG pleasant,   CN from II to XII:  Showed  visual field cut on the L side with  Homonymous L. Lower quadrianopsia,, EOMI, pupil symmetrical, light reflexes are present, fundus exam is not clearly visualized.  Face symmetrical with normal sensation, hearing nor do with MHS twice, but he declined another script. He does have risk of stroke, new DM, HTN, obesity, risk factor discussed. 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

## 2018-06-26 NOTE — PATIENT INSTRUCTIONS
Refill policies:    • Allow 2-3 business days for refills; controlled substances may take longer.   • Contact your pharmacy at least 5 days prior to running out of medication and have them send an electronic request or submit request through the “request re entire amount billed. Precertification and Prior Authorizations: If your physician has recommended that you have a procedure or additional testing performed.   [facility]) will contact your insurance carrier to obtain pre-certi

## 2018-08-21 ENCOUNTER — TELEPHONE (OUTPATIENT)
Dept: INTERNAL MEDICINE CLINIC | Facility: CLINIC | Age: 38
End: 2018-08-21

## 2018-08-21 NOTE — TELEPHONE ENCOUNTER
Patient's spouse called regarding patient. Stated that she is unable to deal with his depression as he is not willing to seek help.  Arun Mtz would like further recommendation    **Spouse noted that it is VERY important that this matter is not directly discuss

## 2018-09-04 DIAGNOSIS — F41.9 ANXIETY: Chronic | ICD-10-CM

## 2018-09-04 DIAGNOSIS — G47.33 OSA (OBSTRUCTIVE SLEEP APNEA): Chronic | ICD-10-CM

## 2018-09-04 RX ORDER — CITALOPRAM 20 MG/1
TABLET ORAL
Qty: 90 TABLET | Refills: 2 | Status: SHIPPED | OUTPATIENT
Start: 2018-09-04 | End: 2019-04-21

## 2018-09-04 NOTE — TELEPHONE ENCOUNTER
Wants Rx changed to mail order pharmacy, contacted [facility] to verify patient picked up first prescription, spoke with Junior Nassar, she states patient did  1 out of the 4 refills, also notified Junior Nassar to cancel current Rx as patient will now use

## 2018-09-16 DIAGNOSIS — E11.9 TYPE 2 DIABETES MELLITUS WITHOUT COMPLICATION (HCC): ICD-10-CM

## 2018-09-16 DIAGNOSIS — G44.219 EPISODIC TENSION-TYPE HEADACHE, NOT INTRACTABLE: Chronic | ICD-10-CM

## 2018-09-16 DIAGNOSIS — I10 ESSENTIAL HYPERTENSION: Chronic | ICD-10-CM

## 2018-09-17 NOTE — TELEPHONE ENCOUNTER
1st attempt to contact patient on cell phone - Phillip Tate is full and was unable to leave message.

## 2018-09-17 NOTE — TELEPHONE ENCOUNTER
Protocol failed for Montelukast, Atorvastatin and Metformin    Medication(s) to Refill:   Requested Prescriptions     Pending Prescriptions Disp Refills   • ATORVASTATIN 20 MG Oral Tab [Pharmacy Med Name: ATORVASTATIN 20MG TABLETS] 90 tablet 0     Sig: ADITI ((4.0 - Albumin) x 0.8 + Calcium     Note: Calculation is only valid when Albumin is less than 4.0g/dL.     Alkaline Phosphatase 45 - 117 U/L 80    AST 15 - 41 U/L 57 Abnormally high     Alt 17 - 63 U/L 144 Abnormally high     Bilirubin, Total 0.1 - 2.0 mg/ average   23.99         11.04             Three times average      Non HDL Chol <130 mg/dL 77    Comment:    Reference ranges for non-HDL-C are based on National      Cholesterol Education III Guidelines:      Desirable:         < 130 mg/dL      Borderline

## 2018-09-19 RX ORDER — TRIAMTERENE AND HYDROCHLOROTHIAZIDE 37.5; 25 MG/1; MG/1
CAPSULE ORAL
Qty: 90 CAPSULE | Refills: 0 | Status: SHIPPED | OUTPATIENT
Start: 2018-09-19 | End: 2018-12-05

## 2018-09-19 RX ORDER — MONTELUKAST SODIUM 10 MG/1
TABLET ORAL
Qty: 90 TABLET | Refills: 0 | Status: SHIPPED | OUTPATIENT
Start: 2018-09-19 | End: 2019-01-03

## 2018-09-19 RX ORDER — AMLODIPINE AND VALSARTAN 10; 320 MG/1; MG/1
TABLET ORAL
Qty: 90 TABLET | Refills: 0 | Status: SHIPPED | OUTPATIENT
Start: 2018-09-19 | End: 2018-11-26

## 2018-09-19 RX ORDER — ATORVASTATIN CALCIUM 20 MG/1
TABLET, FILM COATED ORAL
Qty: 90 TABLET | Refills: 0 | Status: SHIPPED | OUTPATIENT
Start: 2018-09-19 | End: 2019-01-03

## 2018-09-19 RX ORDER — OMEPRAZOLE 20 MG/1
CAPSULE, DELAYED RELEASE ORAL
Qty: 90 CAPSULE | Refills: 0 | Status: SHIPPED | OUTPATIENT
Start: 2018-09-19 | End: 2018-12-05

## 2018-11-02 VITALS
TEMPERATURE: 99.1 F | HEART RATE: 92 BPM | HEIGHT: 71 IN | BODY MASS INDEX: 44.1 KG/M2 | OXYGEN SATURATION: 96 % | RESPIRATION RATE: 18 BRPM | WEIGHT: 315 LBS

## 2018-11-23 DIAGNOSIS — E11.9 TYPE 2 DIABETES MELLITUS WITHOUT COMPLICATION, WITHOUT LONG-TERM CURRENT USE OF INSULIN (HCC): ICD-10-CM

## 2018-11-23 DIAGNOSIS — Z00.00 ROUTINE GENERAL MEDICAL EXAMINATION AT A HEALTH CARE FACILITY: Primary | ICD-10-CM

## 2018-11-23 DIAGNOSIS — I10 ESSENTIAL HYPERTENSION: Chronic | ICD-10-CM

## 2018-11-26 DIAGNOSIS — I10 ESSENTIAL HYPERTENSION: Chronic | ICD-10-CM

## 2018-11-26 NOTE — TELEPHONE ENCOUNTER
Protocol failed - Patient due for labs and CPX    Medication(s) to Refill:   Requested Prescriptions     Pending Prescriptions Disp Refills   • AMLODIPINE BESYLATE-VALSARTAN  MG Oral Tab [Pharmacy Med Name: AMLODIPINE-VALSARTAN 10-320MG TABS] 90 tabl

## 2018-11-27 RX ORDER — AMLODIPINE AND VALSARTAN 10; 320 MG/1; MG/1
1 TABLET ORAL DAILY
Qty: 90 TABLET | Refills: 0 | Status: SHIPPED | OUTPATIENT
Start: 2018-11-27 | End: 2019-01-03

## 2018-11-27 RX ORDER — AMLODIPINE AND VALSARTAN 10; 320 MG/1; MG/1
1 TABLET ORAL DAILY
Qty: 30 TABLET | Refills: 0 | Status: SHIPPED | OUTPATIENT
Start: 2018-11-27 | End: 2019-01-03

## 2018-11-27 NOTE — TELEPHONE ENCOUNTER
Your appointments     Date & Time Appointment Department [facility])    Jan 03, 2019 10:00 AM CST Physical - Established Patient with Zheng Novoa MD [address] [place]        [employer]

## 2018-11-27 NOTE — TELEPHONE ENCOUNTER
LMTCB x 1   Due for OV    Refill requested:   Requested Prescriptions     Pending Prescriptions Disp Refills   • Amlodipine Besylate-Valsartan  MG Oral Tab [Pharmacy Med Name: AMLODIPINE-VALSARTAN 10-320MG TABS] 90 tablet 0     Sig: Take 1 tablet by

## 2018-11-30 NOTE — TELEPHONE ENCOUNTER
Patient has CPX in January -     Future Appointments   Date Time Provider Baljit Mathew   1/3/2019 10:00 AM Ralph Pitts MD [facility] [place]

## 2018-12-01 RX ORDER — AMLODIPINE AND VALSARTAN 10; 320 MG/1; MG/1
TABLET ORAL
Qty: 90 TABLET | Refills: 0 | Status: SHIPPED | OUTPATIENT
Start: 2018-12-01 | End: 2019-06-25

## 2018-12-05 RX ORDER — TRIAMTERENE AND HYDROCHLOROTHIAZIDE 37.5; 25 MG/1; MG/1
CAPSULE ORAL
Qty: 90 CAPSULE | Refills: 0 | Status: SHIPPED | OUTPATIENT
Start: 2018-12-05 | End: 2019-03-17

## 2018-12-05 RX ORDER — OMEPRAZOLE 20 MG/1
CAPSULE, DELAYED RELEASE ORAL
Qty: 90 CAPSULE | Refills: 0 | Status: SHIPPED | OUTPATIENT
Start: 2018-12-05 | End: 2019-03-17

## 2018-12-27 NOTE — LETTER
06/10/21        Ashley Chamberlain  [address] [facility] [address]      Dear Katty Sepulveda,    [facility] records indicate that you have outstanding lab work and or testing that was ordered for you and has not yet been completed:  CARD ECHO 2D DOPPLER (CPT=
Adequate: hears normal conversation without difficulty

## 2019-01-03 ENCOUNTER — OFFICE VISIT (OUTPATIENT)
Dept: INTERNAL MEDICINE CLINIC | Facility: CLINIC | Age: 39
End: 2019-01-03
Payer: COMMERCIAL

## 2019-01-03 ENCOUNTER — LAB ENCOUNTER (OUTPATIENT)
Dept: LAB | Age: 39
End: 2019-01-03
Attending: INTERNAL MEDICINE
Payer: COMMERCIAL

## 2019-01-03 VITALS
RESPIRATION RATE: 16 BRPM | TEMPERATURE: 99 F | DIASTOLIC BLOOD PRESSURE: 88 MMHG | OXYGEN SATURATION: 96 % | HEIGHT: 71.5 IN | SYSTOLIC BLOOD PRESSURE: 120 MMHG | HEART RATE: 107 BPM | BODY MASS INDEX: 43.13 KG/M2 | WEIGHT: 315 LBS

## 2019-01-03 DIAGNOSIS — Z00.00 ROUTINE GENERAL MEDICAL EXAMINATION AT A HEALTH CARE FACILITY: Primary | ICD-10-CM

## 2019-01-03 DIAGNOSIS — E11.9 TYPE 2 DIABETES MELLITUS WITHOUT COMPLICATION (HCC): ICD-10-CM

## 2019-01-03 DIAGNOSIS — G44.219 EPISODIC TENSION-TYPE HEADACHE, NOT INTRACTABLE: Chronic | ICD-10-CM

## 2019-01-03 DIAGNOSIS — G47.33 OSA (OBSTRUCTIVE SLEEP APNEA): ICD-10-CM

## 2019-01-03 DIAGNOSIS — L98.9 SKIN LESION: ICD-10-CM

## 2019-01-03 DIAGNOSIS — E11.9 TYPE 2 DIABETES MELLITUS WITHOUT COMPLICATION, WITHOUT LONG-TERM CURRENT USE OF INSULIN (HCC): ICD-10-CM

## 2019-01-03 DIAGNOSIS — Z00.00 ROUTINE GENERAL MEDICAL EXAMINATION AT A HEALTH CARE FACILITY: ICD-10-CM

## 2019-01-03 LAB
ALBUMIN SERPL-MCNC: 3.7 G/DL (ref 3.1–4.5)
ALBUMIN/GLOB SERPL: 0.9 {RATIO} (ref 1–2)
ALP LIVER SERPL-CCNC: 75 U/L (ref 45–117)
ALT SERPL-CCNC: 97 U/L (ref 17–63)
ANION GAP SERPL CALC-SCNC: 8 MMOL/L (ref 0–18)
AST SERPL-CCNC: 38 U/L (ref 15–41)
BASOPHILS # BLD AUTO: 0.08 X10(3) UL (ref 0–0.1)
BASOPHILS NFR BLD AUTO: 1.1 %
BILIRUB SERPL-MCNC: 0.4 MG/DL (ref 0.1–2)
BILIRUB UR QL STRIP.AUTO: NEGATIVE
BUN BLD-MCNC: 13 MG/DL (ref 8–20)
BUN/CREAT SERPL: 14.8 (ref 10–20)
CALCIUM BLD-MCNC: 9.2 MG/DL (ref 8.3–10.3)
CHLORIDE SERPL-SCNC: 103 MMOL/L (ref 101–111)
CHOLEST SMN-MCNC: 138 MG/DL (ref ?–200)
CLARITY UR REFRACT.AUTO: CLEAR
CO2 SERPL-SCNC: 26 MMOL/L (ref 22–32)
COLOR UR AUTO: YELLOW
CREAT BLD-MCNC: 0.88 MG/DL (ref 0.7–1.3)
CREAT UR-SCNC: 128 MG/DL
EOSINOPHIL # BLD AUTO: 0.11 X10(3) UL (ref 0–0.3)
EOSINOPHIL NFR BLD AUTO: 1.5 %
ERYTHROCYTE [DISTWIDTH] IN BLOOD BY AUTOMATED COUNT: 13 % (ref 11.5–16)
EST. AVERAGE GLUCOSE BLD GHB EST-MCNC: 266 MG/DL (ref 68–126)
GLOBULIN PLAS-MCNC: 4 G/DL (ref 2.8–4.4)
GLUCOSE BLD-MCNC: 327 MG/DL (ref 70–99)
GLUCOSE UR STRIP.AUTO-MCNC: >=500 MG/DL
HBA1C MFR BLD HPLC: 10.9 % (ref ?–5.7)
HCT VFR BLD AUTO: 44.6 % (ref 37–53)
HDLC SERPL-MCNC: 35 MG/DL (ref 40–59)
HGB BLD-MCNC: 15.2 G/DL (ref 13–17)
IMMATURE GRANULOCYTE COUNT: 0.02 X10(3) UL (ref 0–1)
IMMATURE GRANULOCYTE RATIO %: 0.3 %
INSULIN: 25 MU/L (ref 1.7–31)
LDLC SERPL CALC-MCNC: 69 MG/DL (ref ?–100)
LEUKOCYTE ESTERASE UR QL STRIP.AUTO: NEGATIVE
LYMPHOCYTES # BLD AUTO: 2.74 X10(3) UL (ref 0.9–4)
LYMPHOCYTES NFR BLD AUTO: 36.7 %
M PROTEIN MFR SERPL ELPH: 7.7 G/DL (ref 6.4–8.2)
MCH RBC QN AUTO: 30.2 PG (ref 27–33.2)
MCHC RBC AUTO-ENTMCNC: 34.1 G/DL (ref 31–37)
MCV RBC AUTO: 88.5 FL (ref 80–99)
MICROALBUMIN UR-MCNC: 5.99 MG/DL
MICROALBUMIN/CREAT 24H UR-RTO: 46.8 UG/MG (ref ?–30)
MONOCYTES # BLD AUTO: 0.51 X10(3) UL (ref 0.1–1)
MONOCYTES NFR BLD AUTO: 6.8 %
NEUTROPHIL ABS PRELIM: 4 X10 (3) UL (ref 1.3–6.7)
NEUTROPHILS # BLD AUTO: 4 X10(3) UL (ref 1.3–6.7)
NEUTROPHILS NFR BLD AUTO: 53.6 %
NITRITE UR QL STRIP.AUTO: NEGATIVE
NONHDLC SERPL-MCNC: 103 MG/DL (ref ?–130)
OSMOLALITY SERPL CALC.SUM OF ELEC: 297 MOSM/KG (ref 275–295)
PH UR STRIP.AUTO: 5 [PH] (ref 4.5–8)
PLATELET # BLD AUTO: 223 10(3)UL (ref 150–450)
POTASSIUM SERPL-SCNC: 4 MMOL/L (ref 3.6–5.1)
PROT UR STRIP.AUTO-MCNC: NEGATIVE MG/DL
RBC # BLD AUTO: 5.04 X10(6)UL (ref 4.3–5.7)
RBC UR QL AUTO: NEGATIVE
RED CELL DISTRIBUTION WIDTH-SD: 41.3 FL (ref 35.1–46.3)
SODIUM SERPL-SCNC: 137 MMOL/L (ref 136–144)
SP GR UR STRIP.AUTO: 1.03 (ref 1–1.03)
T4 SERPL-MCNC: 8.5 UG/DL (ref 4.5–10.9)
TRIGL SERPL-MCNC: 168 MG/DL (ref 30–149)
TSI SER-ACNC: 1.96 MIU/ML (ref 0.35–5.5)
UROBILINOGEN UR STRIP.AUTO-MCNC: <2 MG/DL
VLDLC SERPL CALC-MCNC: 34 MG/DL (ref 0–30)
WBC # BLD AUTO: 7.5 X10(3) UL (ref 4–13)

## 2019-01-03 PROCEDURE — 83525 ASSAY OF INSULIN: CPT | Performed by: INTERNAL MEDICINE

## 2019-01-03 PROCEDURE — 93000 ELECTROCARDIOGRAM COMPLETE: CPT | Performed by: INTERNAL MEDICINE

## 2019-01-03 PROCEDURE — 99396 PREV VISIT EST AGE 40-64: CPT | Performed by: INTERNAL MEDICINE

## 2019-01-03 PROCEDURE — 83036 HEMOGLOBIN GLYCOSYLATED A1C: CPT | Performed by: INTERNAL MEDICINE

## 2019-01-03 PROCEDURE — 80050 GENERAL HEALTH PANEL: CPT | Performed by: INTERNAL MEDICINE

## 2019-01-03 PROCEDURE — 99213 OFFICE O/P EST LOW 20 MIN: CPT | Performed by: INTERNAL MEDICINE

## 2019-01-03 PROCEDURE — 36415 COLL VENOUS BLD VENIPUNCTURE: CPT | Performed by: INTERNAL MEDICINE

## 2019-01-03 PROCEDURE — 80061 LIPID PANEL: CPT | Performed by: INTERNAL MEDICINE

## 2019-01-03 PROCEDURE — 82570 ASSAY OF URINE CREATININE: CPT | Performed by: INTERNAL MEDICINE

## 2019-01-03 PROCEDURE — 82043 UR ALBUMIN QUANTITATIVE: CPT | Performed by: INTERNAL MEDICINE

## 2019-01-03 PROCEDURE — 81003 URINALYSIS AUTO W/O SCOPE: CPT | Performed by: INTERNAL MEDICINE

## 2019-01-03 PROCEDURE — 84436 ASSAY OF TOTAL THYROXINE: CPT | Performed by: INTERNAL MEDICINE

## 2019-01-03 RX ORDER — ATORVASTATIN CALCIUM 20 MG/1
20 TABLET, FILM COATED ORAL
Qty: 90 TABLET | Refills: 0 | Status: SHIPPED | OUTPATIENT
Start: 2019-01-03 | End: 2019-07-24

## 2019-01-03 RX ORDER — ALBUTEROL SULFATE 90 UG/1
2 AEROSOL, METERED RESPIRATORY (INHALATION) EVERY 4 HOURS PRN
Qty: 1 INHALER | Refills: 1 | Status: SHIPPED | OUTPATIENT
Start: 2019-01-03 | End: 2019-01-09

## 2019-01-03 RX ORDER — MONTELUKAST SODIUM 10 MG/1
10 TABLET ORAL
Qty: 90 TABLET | Refills: 0 | Status: SHIPPED | OUTPATIENT
Start: 2019-01-03 | End: 2019-03-22

## 2019-01-03 NOTE — PROGRESS NOTES
Rossi Castelan [facility] 1980 is a 34year old male. Patient presents with:  Physical      HPI:   No new cc    Current Outpatient Medications:  atorvastatin 20 MG Oral Tab Take 1 tablet (20 mg total) by mouth once daily.  Disp: 90 tablet Rfl: 0   MetFORMIN drainage. Ears no earaches, no fullness, normal hearing, no tinnitus. Nose and Sinuses no recurrent colds, no stuffiness, no discharge, no hay fever, no nosebleeds, no sinus trouble. Mouth and Pharynx no sore throats, no hoarseness.  Neck no lumps, no goite Insomnia none/Memory loss none.         Hx  of obstructive sleep apnea last sleep study done over 10 years ago feels he is not getting restful sleep and often getting up tired in the morning      EXAM:   /88   Pulse 107   Temp 98.7 °F (37.1 °C) (Oral) modalities normal.   LYMPHATICS:   Cervical: none. Groin: no adenopathy . Inguinal: no adenopathy. Supraclavicular: none.    DERMATOLOGY:   Rash: no. 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   Sonya Pires MD

## 2019-01-08 NOTE — MR AVS SNAPSHOT
[address], [facility] [address] [place] [address]               Thank you for choosing us for your health care visit with Riki Trinidad MD.  We are glad to serve you and happy to provide you with this summary authorize routine medications on weekends. ? No narcotics or controlled substances are refilled after noon on Fridays or by on call physicians. ? By law, narcotics cannot be faxed or phoned into your pharmacy.  The prescription must be signed by the provi diligence, the insurance carrier gives the disclaimer that \"Although the procedure is authorized, this does not guarantee payment. \"    Ultimately the patient is responsible for payment. Thank you for your understanding in the matter.            Fran Carlie.tn None

## 2019-01-09 RX ORDER — ALBUTEROL SULFATE 90 UG/1
2 AEROSOL, METERED RESPIRATORY (INHALATION) EVERY 4 HOURS PRN
Qty: 3 INHALER | Refills: 0 | Status: SHIPPED | OUTPATIENT
Start: 2019-01-09 | End: 2019-03-17

## 2019-03-18 RX ORDER — TRIAMTERENE AND HYDROCHLOROTHIAZIDE 37.5; 25 MG/1; MG/1
CAPSULE ORAL
Qty: 90 CAPSULE | Refills: 0 | Status: SHIPPED | OUTPATIENT
Start: 2019-03-18 | End: 2019-06-05

## 2019-03-18 RX ORDER — OMEPRAZOLE 20 MG/1
CAPSULE, DELAYED RELEASE ORAL
Qty: 90 CAPSULE | Refills: 0 | Status: SHIPPED | OUTPATIENT
Start: 2019-03-18 | End: 2019-06-05

## 2019-03-18 NOTE — TELEPHONE ENCOUNTER
Protocol failed for Ventolin - No AAP/ACT  Protocol passed for Triamterene     Medication(s) to Refill:   Requested Prescriptions     Pending Prescriptions Disp Refills   • OMEPRAZOLE 20 MG Oral Capsule Delayed Release [Pharmacy Med Name: OMEPRAZOLE 20MG C

## 2019-03-23 RX ORDER — MONTELUKAST SODIUM 10 MG/1
TABLET ORAL
Qty: 90 TABLET | Refills: 0 | Status: SHIPPED | OUTPATIENT
Start: 2019-03-23 | End: 2019-06-11

## 2019-03-23 NOTE — TELEPHONE ENCOUNTER
Refill requested:   Requested Prescriptions     Pending Prescriptions Disp Refills   • MONTELUKAST SODIUM 10 MG Oral Tab [Pharmacy Med Name: MONTELUKAST 10MG TABLETS] 90 tablet 0     Sig: TAKE 1 TABLET BY MOUTH ONCE DAILY       Failed protocol    Asthma &

## 2019-04-21 DIAGNOSIS — F41.9 ANXIETY: Chronic | ICD-10-CM

## 2019-04-21 DIAGNOSIS — G47.33 OSA (OBSTRUCTIVE SLEEP APNEA): Chronic | ICD-10-CM

## 2019-04-22 RX ORDER — CITALOPRAM 20 MG/1
TABLET ORAL
Qty: 90 TABLET | Refills: 0 | Status: SHIPPED | OUTPATIENT
Start: 2019-04-22

## 2019-04-22 NOTE — TELEPHONE ENCOUNTER
Medication: CITALOPRAM HYDROBROMIDE 20 MG Oral Tab    Date of last refill: 09/04/18 (#90/2)  [place]    Last office visit: 06/26/18  Due back to clinic per last office note:  Around 06/26/19  Date next office visit s

## 2019-05-21 ENCOUNTER — TELEPHONE (OUTPATIENT)
Dept: NEUROLOGY | Facility: CLINIC | Age: 39
End: 2019-05-21

## 2019-05-21 ENCOUNTER — TELEPHONE (OUTPATIENT)
Dept: INTERNAL MEDICINE CLINIC | Facility: CLINIC | Age: 39
End: 2019-05-21

## 2019-05-21 NOTE — TELEPHONE ENCOUNTER
Called Shreya with [employer] back and she wanted to verify pt taking metformin 1000 mg bid for DM. Advised that pt was supposed to follow up but he has yet to do so after 5 attempts to reach him. She stated she would attempt to reach out to him as well.    Called julieta

## 2019-06-03 DIAGNOSIS — G44.219 EPISODIC TENSION-TYPE HEADACHE, NOT INTRACTABLE: Chronic | ICD-10-CM

## 2019-06-03 DIAGNOSIS — E11.9 TYPE 2 DIABETES MELLITUS WITHOUT COMPLICATION (HCC): ICD-10-CM

## 2019-06-04 NOTE — TELEPHONE ENCOUNTER
Last OV: 1/3/19 with Dr. Chapin Wang   Last refill date: 1/3/19 (Metformin 1000mg)    3/18/19 (Ventolin Inhaler)    #/refills: #180, 0 refills (Metformin 1000mg)    #54g, 0 refills (Ventolin Inhaler)     When pt was asked to return for OV: 2 weeks  Upcoming ap

## 2019-06-05 RX ORDER — TRIAMTERENE AND HYDROCHLOROTHIAZIDE 37.5; 25 MG/1; MG/1
CAPSULE ORAL
Qty: 90 CAPSULE | Refills: 0 | Status: SHIPPED | OUTPATIENT
Start: 2019-06-05 | End: 2019-08-17

## 2019-06-05 RX ORDER — OMEPRAZOLE 20 MG/1
CAPSULE, DELAYED RELEASE ORAL
Qty: 90 CAPSULE | Refills: 0 | Status: SHIPPED | OUTPATIENT
Start: 2019-06-05 | End: 2019-08-17

## 2019-06-11 RX ORDER — MONTELUKAST SODIUM 10 MG/1
TABLET ORAL
Qty: 90 TABLET | Refills: 0 | Status: SHIPPED | OUTPATIENT
Start: 2019-06-11 | End: 2019-08-17

## 2019-06-11 NOTE — TELEPHONE ENCOUNTER
Refill requested:   Requested Prescriptions     Pending Prescriptions Disp Refills   • MONTELUKAST SODIUM 10 MG Oral Tab [Pharmacy Med Name: MONTELUKAST 10MG TABLETS] 90 tablet 0     Sig: TAKE 1 TABLET BY MOUTH ONCE DAILY       Failed protocol  Asthma [facility]

## 2019-06-12 DIAGNOSIS — E11.9 TYPE 2 DIABETES MELLITUS WITHOUT COMPLICATION (HCC): ICD-10-CM

## 2019-06-12 DIAGNOSIS — G44.219 EPISODIC TENSION-TYPE HEADACHE, NOT INTRACTABLE: Chronic | ICD-10-CM

## 2019-06-14 NOTE — TELEPHONE ENCOUNTER
Failed protocol     Last refill:  6/4/2019 54 g NR [facility]  6/4/2019 Metformin 1000 mg #180 [facility]    A1C/Microalbumin - 1/3/2019     LOV:   1/3/2019 Dr Jose Rey RTC 2 weeks     No FOV scheduled

## 2019-06-17 ENCOUNTER — TELEPHONE (OUTPATIENT)
Dept: INTERNAL MEDICINE CLINIC | Facility: CLINIC | Age: 39
End: 2019-06-17

## 2019-06-17 NOTE — TELEPHONE ENCOUNTER
Shreya care coordinator from [facility] is calling to speak with a nurse to discuss the patient's compliance issues. Please advise.

## 2019-06-24 DIAGNOSIS — G44.219 EPISODIC TENSION-TYPE HEADACHE, NOT INTRACTABLE: Chronic | ICD-10-CM

## 2019-06-24 DIAGNOSIS — E11.9 TYPE 2 DIABETES MELLITUS WITHOUT COMPLICATION (HCC): ICD-10-CM

## 2019-06-24 DIAGNOSIS — I10 ESSENTIAL HYPERTENSION: Chronic | ICD-10-CM

## 2019-06-24 NOTE — TELEPHONE ENCOUNTER
Patient would like Metformin refilled to local pharmacy [facility] for 30 day he is completely out; he still has not received replacement RX for AMLODIPINE BESYLATE-VALSARTAN  MG Oral Tab and is also out.   Patient scheduled AUgust appointment wi

## 2019-06-25 RX ORDER — AMLODIPINE AND VALSARTAN 10; 320 MG/1; MG/1
1 TABLET ORAL
Qty: 90 TABLET | Refills: 0 | Status: SHIPPED | OUTPATIENT
Start: 2019-06-25 | End: 2019-07-09

## 2019-06-25 RX ORDER — AMLODIPINE AND VALSARTAN 10; 320 MG/1; MG/1
1 TABLET ORAL
Qty: 90 TABLET | Refills: 0 | Status: SHIPPED | OUTPATIENT
Start: 2019-06-25 | End: 2019-06-25

## 2019-06-25 NOTE — TELEPHONE ENCOUNTER
Protocol failed - Last HgBA1C < 7.5    Medication(s) to Refill:   Requested Prescriptions     Pending Prescriptions Disp Refills   • amLODIPine Besylate-Valsartan  MG Oral Tab 90 tablet 0     Sig: Take 1 tablet by mouth once daily.    • metFORMIN HCl

## 2019-06-25 NOTE — TELEPHONE ENCOUNTER
Medications sent to incorrect pharmacy - error has been corrected and [address]     [address] [phone], [phone]

## 2019-07-08 DIAGNOSIS — F41.9 ANXIETY: Chronic | ICD-10-CM

## 2019-07-08 DIAGNOSIS — G47.33 OSA (OBSTRUCTIVE SLEEP APNEA): Chronic | ICD-10-CM

## 2019-07-08 NOTE — TELEPHONE ENCOUNTER
[facility] Dr Rizwan Mendoza before refill can be processed.       Medication: CITALOPRAM HYDROBROMIDE 20 MG    Date of last refill: 4/22/19 (#90/0)  [place]     Last office visit: Visit date not found  Due b

## 2019-07-09 ENCOUNTER — TELEPHONE (OUTPATIENT)
Dept: INTERNAL MEDICINE CLINIC | Facility: CLINIC | Age: 39
End: 2019-07-09

## 2019-07-09 RX ORDER — AMLODIPINE AND VALSARTAN 10; 320 MG/1; MG/1
1 TABLET ORAL
Qty: 90 TABLET | Refills: 0 | Status: SHIPPED | OUTPATIENT
Start: 2019-07-09 | End: 2019-08-17

## 2019-07-09 NOTE — TELEPHONE ENCOUNTER
Pt calling in, stated he will need his full remainder prescription for[de-identified] amLODIPine Besylate-Valsartan  MG Oral Tab  metFORMIN HCl 1000 MG Oral Tab    To be sent to: [facility], [address]

## 2019-07-09 NOTE — TELEPHONE ENCOUNTER
Incorrect qty sent to pt - now needs short supply sent to [address] [facility] in [facility] due to going out of town.     Future Appointments   Date Time Provider Baljit Mathew   8/17/2019  9:00 AM Philly Brown MD [facility] [place]     Spoke with pt a

## 2019-07-22 ENCOUNTER — TELEPHONE (OUTPATIENT)
Dept: INTERNAL MEDICINE CLINIC | Facility: CLINIC | Age: 39
End: 2019-07-22

## 2019-07-22 DIAGNOSIS — E11.9 TYPE 2 DIABETES MELLITUS WITHOUT COMPLICATION (HCC): ICD-10-CM

## 2019-07-23 NOTE — TELEPHONE ENCOUNTER
Atorastatin 20 mg    Last OV relevant to medication: 1-3-2019     Last refill date: 1-3-2019     When pt was asked to return for OV:2 weeks     Upcoming appt/reason: 8-     Labs:    1/3/19 10:50 AM    Cholesterol, Total <200 mg/dL 138

## 2019-07-24 RX ORDER — ATORVASTATIN CALCIUM 20 MG/1
20 TABLET, FILM COATED ORAL
Qty: 90 TABLET | Refills: 0 | Status: SHIPPED | OUTPATIENT
Start: 2019-07-24 | End: 2019-07-25

## 2019-07-25 ENCOUNTER — TELEPHONE (OUTPATIENT)
Dept: INTERNAL MEDICINE CLINIC | Facility: CLINIC | Age: 39
End: 2019-07-25

## 2019-07-25 RX ORDER — ATORVASTATIN CALCIUM 20 MG/1
20 TABLET, FILM COATED ORAL
Qty: 90 TABLET | Refills: 0 | Status: SHIPPED | OUTPATIENT
Start: 2019-07-25 | End: 2019-07-25

## 2019-07-25 RX ORDER — ATORVASTATIN CALCIUM 20 MG/1
20 TABLET, FILM COATED ORAL
Qty: 30 TABLET | Refills: 0 | Status: SHIPPED | OUTPATIENT
Start: 2019-07-25 | End: 2019-08-17

## 2019-07-25 NOTE — TELEPHONE ENCOUNTER
Please call patient back he is being notified by [facility] that his 30 day Atorastatin is ready for  at [facility]; he expected Mail Order to be filling a 90 day refill as always.  Please advise if he should p/u 30 day or if we can expedite 90 day ref

## 2019-07-25 NOTE — TELEPHONE ENCOUNTER
Medication ordered for #90 no refills. Medication reordered and sent to mail order pharmacy. Pt notified and is requesting a short supply to be sent to his local pharmacy d/t not having enough medication to last until rx gets in.    Medication sent to

## 2019-08-17 ENCOUNTER — OFFICE VISIT (OUTPATIENT)
Dept: INTERNAL MEDICINE CLINIC | Facility: CLINIC | Age: 39
End: 2019-08-17
Payer: COMMERCIAL

## 2019-08-17 VITALS
WEIGHT: 315 LBS | DIASTOLIC BLOOD PRESSURE: 86 MMHG | TEMPERATURE: 99 F | HEART RATE: 89 BPM | HEIGHT: 71.5 IN | BODY MASS INDEX: 43.13 KG/M2 | SYSTOLIC BLOOD PRESSURE: 120 MMHG | RESPIRATION RATE: 16 BRPM | OXYGEN SATURATION: 97 %

## 2019-08-17 DIAGNOSIS — E78.00 PURE HYPERCHOLESTEROLEMIA: Primary | Chronic | ICD-10-CM

## 2019-08-17 DIAGNOSIS — I10 ESSENTIAL HYPERTENSION: Chronic | ICD-10-CM

## 2019-08-17 DIAGNOSIS — E11.9 TYPE 2 DIABETES MELLITUS WITHOUT COMPLICATION (HCC): ICD-10-CM

## 2019-08-17 PROBLEM — H53.469 HOMONYMOUS HEMIANOPSIA DUE TO OLD CEREBRAL INFARCTION: Chronic | Status: ACTIVE | Noted: 2017-05-01

## 2019-08-17 PROBLEM — I69.398 HOMONYMOUS HEMIANOPSIA DUE TO OLD CEREBRAL INFARCTION: Chronic | Status: ACTIVE | Noted: 2017-05-01

## 2019-08-17 PROCEDURE — 99213 OFFICE O/P EST LOW 20 MIN: CPT | Performed by: INTERNAL MEDICINE

## 2019-08-17 RX ORDER — ATORVASTATIN CALCIUM 20 MG/1
20 TABLET, FILM COATED ORAL
Qty: 90 TABLET | Refills: 1 | Status: SHIPPED | OUTPATIENT
Start: 2019-08-17 | End: 2020-02-24

## 2019-08-17 RX ORDER — OMEPRAZOLE 20 MG/1
20 CAPSULE, DELAYED RELEASE ORAL
Qty: 90 CAPSULE | Refills: 1 | Status: SHIPPED | OUTPATIENT
Start: 2019-08-17 | End: 2019-11-22

## 2019-08-17 RX ORDER — TRIAMTERENE AND HYDROCHLOROTHIAZIDE 37.5; 25 MG/1; MG/1
1 CAPSULE ORAL EVERY MORNING
Qty: 90 CAPSULE | Refills: 1 | Status: SHIPPED | OUTPATIENT
Start: 2019-08-17 | End: 2019-11-21

## 2019-08-17 RX ORDER — AMLODIPINE AND VALSARTAN 10; 320 MG/1; MG/1
1 TABLET ORAL
Qty: 90 TABLET | Refills: 0 | Status: SHIPPED | OUTPATIENT
Start: 2019-08-17 | End: 2020-02-12

## 2019-08-17 RX ORDER — MONTELUKAST SODIUM 10 MG/1
10 TABLET ORAL
Qty: 90 TABLET | Refills: 1 | Status: SHIPPED | OUTPATIENT
Start: 2019-08-17 | End: 2019-11-22

## 2019-08-17 NOTE — PATIENT INSTRUCTIONS
In a CT calcium scoring of the heart in view of his multiple risk factors  Await blood work prior to recommendations

## 2019-08-17 NOTE — PROGRESS NOTES
Mallika Angeles  1980 is a 37year old male. Patient presents with: Follow - Up       HPI:   Here for routine check.   Denies any specific complain except some occasional itchiness of the anterior chest more on the inside    Current Outpatient Medi extremities no. Dizziness no. Dyspnea on exertion none. Fainting none. Fatigue no. High blood pressure on medication(s). Irregular heart beat no. Leg edema no. Murmurs no. Orthopnea no.       EXAM:   /86 (BP Location: Left arm, Patient Position: Sitti in view of his multiple risk factors  Await blood work prior to recommendations       The patient indicates understanding of these issues and agrees to the plan. The patient is asked to Return in about 3 months (around 11/17/2019). Alejandra Dotson MD

## 2019-08-20 RX ORDER — CITALOPRAM 20 MG/1
TABLET ORAL
Qty: 90 TABLET | Refills: 0 | OUTPATIENT
Start: 2019-08-20

## 2019-11-05 ENCOUNTER — HOSPITAL ENCOUNTER (OUTPATIENT)
Age: 39
Discharge: HOME | End: 2019-11-05
Attending: FAMILY MEDICINE
Payer: COMMERCIAL

## 2019-11-05 ENCOUNTER — APPOINTMENT (OUTPATIENT)
Dept: GENERAL RADIOLOGY | Age: 39
End: 2019-11-05
Attending: FAMILY MEDICINE
Payer: COMMERCIAL

## 2019-11-05 VITALS
OXYGEN SATURATION: 98 % | HEART RATE: 88 BPM | DIASTOLIC BLOOD PRESSURE: 82 MMHG | TEMPERATURE: 98 F | SYSTOLIC BLOOD PRESSURE: 126 MMHG | RESPIRATION RATE: 20 BRPM

## 2019-11-05 DIAGNOSIS — J40 BRONCHITIS: ICD-10-CM

## 2019-11-05 DIAGNOSIS — J01.90 ACUTE NON-RECURRENT SINUSITIS, UNSPECIFIED LOCATION: Primary | ICD-10-CM

## 2019-11-05 PROCEDURE — 71046 X-RAY EXAM CHEST 2 VIEWS: CPT | Performed by: FAMILY MEDICINE

## 2019-11-05 PROCEDURE — 99214 OFFICE O/P EST MOD 30 MIN: CPT

## 2019-11-05 PROCEDURE — 99213 OFFICE O/P EST LOW 20 MIN: CPT

## 2019-11-05 RX ORDER — FLUTICASONE PROPIONATE 50 MCG
2 SPRAY, SUSPENSION (ML) NASAL DAILY
Qty: 16 G | Refills: 0 | Status: SHIPPED | OUTPATIENT
Start: 2019-11-05 | End: 2019-12-05

## 2019-11-05 RX ORDER — PREDNISONE 20 MG/1
40 TABLET ORAL DAILY
Qty: 10 TABLET | Refills: 0 | Status: SHIPPED | OUTPATIENT
Start: 2019-11-05 | End: 2019-11-10

## 2019-11-05 RX ORDER — AMOXICILLIN AND CLAVULANATE POTASSIUM 875; 125 MG/1; MG/1
1 TABLET, FILM COATED ORAL 2 TIMES DAILY
Qty: 20 TABLET | Refills: 0 | Status: SHIPPED | OUTPATIENT
Start: 2019-11-05 | End: 2019-11-15

## 2019-11-05 RX ORDER — ALBUTEROL SULFATE 90 UG/1
2 AEROSOL, METERED RESPIRATORY (INHALATION) EVERY 6 HOURS PRN
Qty: 1 INHALER | Refills: 0 | Status: SHIPPED | OUTPATIENT
Start: 2019-11-05 | End: 2021-06-07

## 2019-11-06 NOTE — ED PROVIDER NOTES
Patient Seen in: [facility]      History   Patient presents with:  Cough    Stated Complaint: cough sore throat weakness    HPI    37year old male with history of SIA, GERD and Hypertension presents for sore throat, cough and f Head: Normocephalic and atraumatic.       Right Ear: Hearing, tympanic membrane, ear canal and external ear normal.      Left Ear: Hearing, tympanic membrane, ear canal and external ear normal.      Nose: Nose normal.      Mouth/Throat:      Pharynx: U tablets (40 mg total) by mouth daily for 5 days. , Normal, Disp-10 tablet, R-0

## 2019-11-21 RX ORDER — TRIAMTERENE AND HYDROCHLOROTHIAZIDE 37.5; 25 MG/1; MG/1
1 CAPSULE ORAL EVERY MORNING
Qty: 90 CAPSULE | Refills: 1 | Status: SHIPPED | OUTPATIENT
Start: 2019-11-21 | End: 2020-02-19

## 2019-11-21 NOTE — TELEPHONE ENCOUNTER
Montelukast Sodium 10 MG Oral Tab   Triamterene-HCTZ 37.5-25 MG Oral Cap  omeprazole 20 MG Oral Capsule Delayed Release    Mail order told patient they could not reach us to refill; informed patient we have not received from them, also he has one refill fo

## 2019-11-21 NOTE — TELEPHONE ENCOUNTER
Failed protocol for Montelukast    Medication(s) to Refill:   Requested Prescriptions     Pending Prescriptions Disp Refills   • Triamterene-HCTZ 37.5-25 MG Oral Cap 90 capsule 1     Sig: Take 1 capsule by mouth every morning.    • omeprazole 20 MG Oral Cap

## 2019-11-22 RX ORDER — MONTELUKAST SODIUM 10 MG/1
10 TABLET ORAL
Qty: 90 TABLET | Refills: 1 | Status: SHIPPED | OUTPATIENT
Start: 2019-11-22 | End: 2020-02-19

## 2019-11-22 RX ORDER — OMEPRAZOLE 20 MG/1
20 CAPSULE, DELAYED RELEASE ORAL
Qty: 90 CAPSULE | Refills: 1 | Status: SHIPPED | OUTPATIENT
Start: 2019-11-22 | End: 2020-02-19

## 2019-11-25 DIAGNOSIS — E11.9 TYPE 2 DIABETES MELLITUS WITHOUT COMPLICATION (HCC): ICD-10-CM

## 2019-11-26 RX ORDER — ATORVASTATIN CALCIUM 20 MG/1
TABLET, FILM COATED ORAL
Qty: 90 TABLET | Refills: 0 | Status: SHIPPED | OUTPATIENT
Start: 2019-11-26 | End: 2020-03-11

## 2019-11-26 NOTE — TELEPHONE ENCOUNTER
Protocol failed - ALT < 80    Requesting atorvastatin 20 MG Oral Tab  LOV: 8/17/19  RTC: 3 months  Last Relevant Labs: 01/3/19  Filled: 8/17/19 #90 with 1 refills    No future appointments.

## 2020-02-11 DIAGNOSIS — I10 ESSENTIAL HYPERTENSION: Chronic | ICD-10-CM

## 2020-02-12 RX ORDER — AMLODIPINE AND VALSARTAN 10; 320 MG/1; MG/1
TABLET ORAL
Qty: 90 TABLET | Refills: 0 | Status: SHIPPED | OUTPATIENT
Start: 2020-02-12 | End: 2020-03-02 | Stop reason: RX

## 2020-02-12 NOTE — TELEPHONE ENCOUNTER
Failed protocol - Labs needed - [employer] message sent to pt to contact office to schedule CPX.      Requesting amLODIPine Besylate-Valsartan  MG Oral Tab  LOV: 8/17/19  RTC: 3 months  Last Relevant Labs: 1/3/19  Filled: 8/17/19 #90 with 0 refills    No

## 2020-02-19 DIAGNOSIS — E11.9 TYPE 2 DIABETES MELLITUS WITHOUT COMPLICATION (HCC): ICD-10-CM

## 2020-02-19 RX ORDER — OMEPRAZOLE 20 MG/1
CAPSULE, DELAYED RELEASE ORAL
Qty: 90 CAPSULE | Refills: 0 | Status: SHIPPED | OUTPATIENT
Start: 2020-02-19 | End: 2020-05-08

## 2020-02-19 RX ORDER — TRIAMTERENE AND HYDROCHLOROTHIAZIDE 37.5; 25 MG/1; MG/1
CAPSULE ORAL
Qty: 90 CAPSULE | Refills: 0 | Status: SHIPPED | OUTPATIENT
Start: 2020-02-19 | End: 2020-05-08

## 2020-02-19 RX ORDER — MONTELUKAST SODIUM 10 MG/1
TABLET ORAL
Qty: 90 TABLET | Refills: 0 | Status: SHIPPED | OUTPATIENT
Start: 2020-02-19 | End: 2020-05-08

## 2020-02-19 NOTE — TELEPHONE ENCOUNTER
Failed protocol - labs needed    Medication(s) to Refill:   Requested Prescriptions     Pending Prescriptions Disp Refills   • OMEPRAZOLE 20 MG Oral Capsule Delayed Release [Pharmacy Med Name: OMEPRAZOLE 20MG CAPSULES] 90 capsule 1     Sig: TAKE ONE CAPSUL

## 2020-02-20 NOTE — TELEPHONE ENCOUNTER
Failed protocol - labs needed    Requesting metFORMIN HCl 1000 MG Oral Tab  LOV: 8/17/19  RTC: 3 months  Last Relevant Labs: 1/3/19  Filled: 8/17/19 #180 with 1 refills    Future Appointments   Date Time Provider Baljit Mathew   2/24/2020  2:00 PM Armando

## 2020-02-24 ENCOUNTER — OFFICE VISIT (OUTPATIENT)
Dept: INTERNAL MEDICINE CLINIC | Facility: CLINIC | Age: 40
End: 2020-02-24
Payer: COMMERCIAL

## 2020-02-24 VITALS
OXYGEN SATURATION: 98 % | HEART RATE: 85 BPM | SYSTOLIC BLOOD PRESSURE: 140 MMHG | HEIGHT: 71.25 IN | RESPIRATION RATE: 18 BRPM | BODY MASS INDEX: 43.61 KG/M2 | WEIGHT: 315 LBS | DIASTOLIC BLOOD PRESSURE: 90 MMHG | TEMPERATURE: 98 F

## 2020-02-24 DIAGNOSIS — E78.00 PURE HYPERCHOLESTEROLEMIA: Chronic | ICD-10-CM

## 2020-02-24 DIAGNOSIS — E11.9 TYPE 2 DIABETES MELLITUS WITHOUT COMPLICATION, UNSPECIFIED WHETHER LONG TERM INSULIN USE (HCC): ICD-10-CM

## 2020-02-24 DIAGNOSIS — Z00.00 ROUTINE GENERAL MEDICAL EXAMINATION AT A HEALTH CARE FACILITY: Primary | ICD-10-CM

## 2020-02-24 DIAGNOSIS — I10 ESSENTIAL HYPERTENSION: Chronic | ICD-10-CM

## 2020-02-24 PROCEDURE — 93000 ELECTROCARDIOGRAM COMPLETE: CPT | Performed by: INTERNAL MEDICINE

## 2020-02-24 PROCEDURE — 99396 PREV VISIT EST AGE 40-64: CPT | Performed by: INTERNAL MEDICINE

## 2020-02-24 RX ORDER — BUSPIRONE HYDROCHLORIDE 15 MG/1
1 TABLET ORAL 2 TIMES DAILY
COMMUNITY
Start: 2020-01-13

## 2020-02-24 NOTE — PROGRESS NOTES
Yuridia Narvaez  1980 is a 37year old male. Patient presents with:  Physical      HPI:   No new cc  Current Outpatient Medications   Medication Sig Dispense Refill   • busPIRone HCl 15 MG Oral Tab Take 1 tablet by mouth 2 (two) times daily.      • , no redness , no drainage. Ears no earaches, no fullness, normal hearing, no tinnitus. Nose and Sinuses no recurrent colds, no stuffiness, no discharge, no hay fever, no nosebleeds, no sinus trouble. Mouth and Pharynx no sore throats, no hoarseness.  Neck psychiatrist for anxiety,  No depression, hallucinations. Insomnia none/Memory loss none. Hx  of obstructive sleep apnea last sleep study done over 10 years ago feels he is getting restful sleep       EXAM:   /90   Pulse 85   Temp 98.1 °F (36. all sensory modalities normal.   LYMPHATICS:   Cervical: none. Groin: no adenopathy . Inguinal: no adenopathy. Supraclavicular: none. DERMATOLOGY:   Rash: no.     DIABETIC FOOT EXAM BILATERAL  INSPECTION normal  CIRCULATION normal  MONOFILAMENT norm

## 2020-03-02 ENCOUNTER — TELEPHONE (OUTPATIENT)
Dept: INTERNAL MEDICINE CLINIC | Facility: CLINIC | Age: 40
End: 2020-03-02

## 2020-03-02 NOTE — TELEPHONE ENCOUNTER
Spoke with Jacinda Boyd at [facility]. He stated all their Valsartan is on back order. He did state they had enough to give the pt a 10 day supply. Advised ok for 10 day supply instead of 15 day supply.

## 2020-03-02 NOTE — TELEPHONE ENCOUNTER
[address] called and stated that the prescription for amLODIPine Besylate-Valsartan 5-160 MG Oral Tab is on back order. Pharmacist wants to know if two separate prescriptions could be called into the pharmacy.  [place] [id number] #[id number] Beatriz Gray

## 2020-03-02 NOTE — TELEPHONE ENCOUNTER
Received call back from [facility]. He stated they actually did not have the 5-160 but did have #30 of the . Verbal given to pharmacist [facility] to provide pt with #30 of . [employer] message sent to pt notifying of the change.

## 2020-03-09 DIAGNOSIS — E11.9 TYPE 2 DIABETES MELLITUS WITHOUT COMPLICATION (HCC): ICD-10-CM

## 2020-03-10 NOTE — TELEPHONE ENCOUNTER
Failed protocol - labs needed - [employer] message sent to pt informing him to have labs completed.      Requesting ATORVASTATIN 20 MG Oral Tab  LOV: 2/24/20  RTC: 6 weeks  Last Relevant Labs: 1/3/19  Filled: 11/26/19 #90 with 0 refills    No future appointmen

## 2020-03-11 RX ORDER — ATORVASTATIN CALCIUM 20 MG/1
TABLET, FILM COATED ORAL
Qty: 90 TABLET | Refills: 0 | Status: SHIPPED | OUTPATIENT
Start: 2020-03-11 | End: 2020-12-19

## 2020-05-07 DIAGNOSIS — E11.9 TYPE 2 DIABETES MELLITUS WITHOUT COMPLICATION (HCC): ICD-10-CM

## 2020-05-08 RX ORDER — TRIAMTERENE AND HYDROCHLOROTHIAZIDE 37.5; 25 MG/1; MG/1
CAPSULE ORAL
Qty: 90 CAPSULE | Refills: 0 | Status: SHIPPED | OUTPATIENT
Start: 2020-05-08 | End: 2020-08-06

## 2020-05-08 RX ORDER — OMEPRAZOLE 20 MG/1
CAPSULE, DELAYED RELEASE ORAL
Qty: 90 CAPSULE | Refills: 0 | Status: SHIPPED | OUTPATIENT
Start: 2020-05-08 | End: 2020-08-06

## 2020-05-08 RX ORDER — MONTELUKAST SODIUM 10 MG/1
TABLET ORAL
Qty: 90 TABLET | Refills: 0 | Status: SHIPPED | OUTPATIENT
Start: 2020-05-08 | End: 2020-08-06

## 2020-05-19 DIAGNOSIS — I10 ESSENTIAL HYPERTENSION: Chronic | ICD-10-CM

## 2020-05-20 NOTE — TELEPHONE ENCOUNTER
Failed protocol     Last refill:  3/2/2020 Amlodipine Valsartan #30 NR    LOV:   2/24/2020 Dr Mar Cabrera RTC 6 weeks   No FOV scheduled     Lab work not completed.

## 2020-05-21 RX ORDER — AMLODIPINE AND VALSARTAN 5; 160 MG/1; MG/1
TABLET ORAL
Qty: 180 TABLET | Refills: 0 | Status: SHIPPED | OUTPATIENT
Start: 2020-05-21 | End: 2020-08-06

## 2020-05-21 NOTE — TELEPHONE ENCOUNTER
Patient has to go for blood work the order was placed in February it is important that he go for this prior to further refills

## 2020-06-04 NOTE — TELEPHONE ENCOUNTER
3rd attempt. [facility] [phone] informing of 's message below. Advised patient to call back with any questions or concerns.

## 2020-06-17 ENCOUNTER — PATIENT MESSAGE (OUTPATIENT)
Dept: INTERNAL MEDICINE CLINIC | Facility: CLINIC | Age: 40
End: 2020-06-17

## 2020-06-17 DIAGNOSIS — G47.33 OSA (OBSTRUCTIVE SLEEP APNEA): Primary | Chronic | ICD-10-CM

## 2020-06-18 ENCOUNTER — LAB ENCOUNTER (OUTPATIENT)
Dept: LAB | Age: 40
End: 2020-06-18
Attending: INTERNAL MEDICINE
Payer: COMMERCIAL

## 2020-06-18 DIAGNOSIS — E11.9 TYPE 2 DIABETES MELLITUS WITHOUT COMPLICATION (HCC): ICD-10-CM

## 2020-06-18 DIAGNOSIS — E78.00 PURE HYPERCHOLESTEROLEMIA: Chronic | ICD-10-CM

## 2020-06-18 DIAGNOSIS — Z00.00 ROUTINE GENERAL MEDICAL EXAMINATION AT A HEALTH CARE FACILITY: ICD-10-CM

## 2020-06-18 DIAGNOSIS — E11.9 TYPE 2 DIABETES MELLITUS WITHOUT COMPLICATION, UNSPECIFIED WHETHER LONG TERM INSULIN USE (HCC): ICD-10-CM

## 2020-06-18 DIAGNOSIS — I10 ESSENTIAL HYPERTENSION: Chronic | ICD-10-CM

## 2020-06-18 PROCEDURE — 82570 ASSAY OF URINE CREATININE: CPT | Performed by: INTERNAL MEDICINE

## 2020-06-18 PROCEDURE — 80050 GENERAL HEALTH PANEL: CPT | Performed by: INTERNAL MEDICINE

## 2020-06-18 PROCEDURE — 83036 HEMOGLOBIN GLYCOSYLATED A1C: CPT | Performed by: INTERNAL MEDICINE

## 2020-06-18 PROCEDURE — 83735 ASSAY OF MAGNESIUM: CPT | Performed by: INTERNAL MEDICINE

## 2020-06-18 PROCEDURE — 82043 UR ALBUMIN QUANTITATIVE: CPT | Performed by: INTERNAL MEDICINE

## 2020-06-18 PROCEDURE — 81001 URINALYSIS AUTO W/SCOPE: CPT | Performed by: INTERNAL MEDICINE

## 2020-06-18 PROCEDURE — 80061 LIPID PANEL: CPT | Performed by: INTERNAL MEDICINE

## 2020-06-18 PROCEDURE — 36415 COLL VENOUS BLD VENIPUNCTURE: CPT | Performed by: INTERNAL MEDICINE

## 2020-07-06 ENCOUNTER — APPOINTMENT (OUTPATIENT)
Dept: CT IMAGING | Facility: HOSPITAL | Age: 40
End: 2020-07-06
Attending: EMERGENCY MEDICINE
Payer: COMMERCIAL

## 2020-07-06 ENCOUNTER — HOSPITAL ENCOUNTER (OUTPATIENT)
Age: 40
Discharge: HOME | End: 2020-07-06
Attending: EMERGENCY MEDICINE
Payer: COMMERCIAL

## 2020-07-06 VITALS
SYSTOLIC BLOOD PRESSURE: 128 MMHG | RESPIRATION RATE: 16 BRPM | WEIGHT: 315 LBS | HEART RATE: 82 BPM | DIASTOLIC BLOOD PRESSURE: 71 MMHG | OXYGEN SATURATION: 96 % | TEMPERATURE: 99 F | BODY MASS INDEX: 53 KG/M2

## 2020-07-06 DIAGNOSIS — K63.89 EPIPLOIC APPENDAGITIS: ICD-10-CM

## 2020-07-06 DIAGNOSIS — R10.32 ABDOMINAL PAIN, LEFT LOWER QUADRANT: Primary | ICD-10-CM

## 2020-07-06 DIAGNOSIS — N30.00 ACUTE CYSTITIS WITHOUT HEMATURIA: ICD-10-CM

## 2020-07-06 LAB
#MXD IC: 0.8 X10ˆ3/UL (ref 0.1–1)
CREAT BLD-MCNC: 0.6 MG/DL (ref 0.7–1.3)
GLUCOSE BLD-MCNC: 227 MG/DL (ref 70–99)
HCT VFR BLD AUTO: 40.7 % (ref 39–53)
HGB BLD-MCNC: 13.6 G/DL (ref 13–17.5)
ISTAT BUN: 15 MG/DL (ref 8–20)
ISTAT CHLORIDE: 100 MMOL/L (ref 101–111)
ISTAT HEMATOCRIT: 41 % (ref 37–53)
ISTAT IONIZED CALCIUM FOR CHEM 8: 1.15 MMOL/L (ref 1.12–1.32)
ISTAT POTASSIUM: 3.9 MMOL/L (ref 3.6–5.1)
ISTAT SODIUM: 139 MMOL/L (ref 136–145)
ISTAT TCO2: 23 MMOL/L (ref 22–32)
LYMPHOCYTES # BLD AUTO: 2.8 X10ˆ3/UL (ref 1–4)
LYMPHOCYTES NFR BLD AUTO: 30.5 %
MCH RBC QN AUTO: 29.2 PG (ref 26–34)
MCHC RBC AUTO-ENTMCNC: 33.4 G/DL (ref 31–37)
MCV RBC AUTO: 87.3 FL (ref 80–100)
MIXED CELL %: 8.7 %
NEUTROPHILS # BLD AUTO: 5.7 X10ˆ3/UL (ref 1.5–7.7)
NEUTROPHILS NFR BLD AUTO: 60.8 %
PLATELET # BLD AUTO: 225 X10ˆ3/UL (ref 150–450)
POCT GLUCOSE URINE: NEGATIVE MG/DL
POCT NITRITE URINE: NEGATIVE
POCT PH URINE: 6 (ref 5–8)
POCT SPECIFIC GRAVITY URINE: 1.03
POCT URINE CLARITY: CLEAR
POCT UROBILINOGEN URINE: 0.2 MG/DL
RBC # BLD AUTO: 4.66 X10ˆ6/UL (ref 4.3–5.7)
WBC # BLD AUTO: 9.3 X10ˆ3/UL (ref 4–11)

## 2020-07-06 PROCEDURE — 99214 OFFICE O/P EST MOD 30 MIN: CPT

## 2020-07-06 PROCEDURE — 74176 CT ABD & PELVIS W/O CONTRAST: CPT | Performed by: EMERGENCY MEDICINE

## 2020-07-06 PROCEDURE — 87086 URINE CULTURE/COLONY COUNT: CPT

## 2020-07-06 PROCEDURE — 80047 BASIC METABLC PNL IONIZED CA: CPT

## 2020-07-06 PROCEDURE — 36415 COLL VENOUS BLD VENIPUNCTURE: CPT

## 2020-07-06 PROCEDURE — 81002 URINALYSIS NONAUTO W/O SCOPE: CPT | Performed by: EMERGENCY MEDICINE

## 2020-07-06 PROCEDURE — 85025 COMPLETE CBC W/AUTO DIFF WBC: CPT | Performed by: EMERGENCY MEDICINE

## 2020-07-06 RX ORDER — METRONIDAZOLE 500 MG/1
500 TABLET ORAL 2 TIMES DAILY
Qty: 20 TABLET | Refills: 0 | Status: SHIPPED | OUTPATIENT
Start: 2020-07-06 | End: 2020-07-16

## 2020-07-06 RX ORDER — CIPROFLOXACIN 500 MG/1
500 TABLET, FILM COATED ORAL 2 TIMES DAILY
Qty: 20 TABLET | Refills: 0 | Status: SHIPPED | OUTPATIENT
Start: 2020-07-06 | End: 2020-07-16

## 2020-07-06 NOTE — ED PROVIDER NOTES
Patient Seen in: [facility]      History   Patient presents with:  Abdomen/Flank Pain    Stated Complaint: abdominal pain,left side    HPI    Patient is a pleasant 49-year-old male presents with left lower quadrant pain that sta Exam    General: Well-appearing patient of stated age resting comfortably  HEENT: Normocephalic atraumatic. Nonicteric sclera. Moist mucous membranes  Lungs: No tachypnea  Cardiac: No tachycardia  Abdomen: Soft.   Tenderness in the left mid abdomen withou lower quadrant  (primary encounter diagnosis)  Epiploic appendagitis  Acute cystitis without hematuria    Disposition:  Discharge  7/6/2020 11:47 am    Follow-up:  Yanci Sood MD  [address] [place] [address]  [address] [place] [address] [phone]    In 3

## 2020-07-06 NOTE — ED INITIAL ASSESSMENT (HPI)
Complains of left lower abdominal pain since 2 am today. Denies fever, nausea, or vomiting. denies diarrhea.

## 2020-07-08 ENCOUNTER — PATIENT MESSAGE (OUTPATIENT)
Dept: INTERNAL MEDICINE CLINIC | Facility: CLINIC | Age: 40
End: 2020-07-08

## 2020-07-09 NOTE — TELEPHONE ENCOUNTER
This was ordered by [facility], see MyChart message from [address] sent to patient after his message informing of results.

## 2020-07-09 NOTE — TELEPHONE ENCOUNTER
From: Priti Mack  To:  Kristie Nuñez MD  Sent: 7/8/2020 2:39 PM CDT  Subject: Test Results Question    I have a question about URINE CULTURE, ROUTINE resulted on 7/7/20, 1:11 PM.    Does this mean everything is normal?

## 2020-08-05 DIAGNOSIS — I10 ESSENTIAL HYPERTENSION: Chronic | ICD-10-CM

## 2020-08-06 DIAGNOSIS — E11.9 TYPE 2 DIABETES MELLITUS WITHOUT COMPLICATION (HCC): ICD-10-CM

## 2020-08-06 RX ORDER — TRIAMTERENE AND HYDROCHLOROTHIAZIDE 37.5; 25 MG/1; MG/1
1 CAPSULE ORAL EVERY MORNING
Qty: 90 CAPSULE | Refills: 0 | Status: SHIPPED | OUTPATIENT
Start: 2020-08-06 | End: 2020-10-23

## 2020-08-06 RX ORDER — AMLODIPINE AND VALSARTAN 5; 160 MG/1; MG/1
TABLET ORAL
Qty: 180 TABLET | Refills: 0 | Status: SHIPPED | OUTPATIENT
Start: 2020-08-06 | End: 2020-10-22

## 2020-08-06 RX ORDER — OMEPRAZOLE 20 MG/1
20 CAPSULE, DELAYED RELEASE ORAL DAILY
Qty: 90 CAPSULE | Refills: 0 | Status: SHIPPED | OUTPATIENT
Start: 2020-08-06 | End: 2020-10-23

## 2020-08-06 RX ORDER — MONTELUKAST SODIUM 10 MG/1
10 TABLET ORAL DAILY
Qty: 90 TABLET | Refills: 0 | Status: SHIPPED | OUTPATIENT
Start: 2020-08-06 | End: 2020-10-23

## 2020-08-06 NOTE — TELEPHONE ENCOUNTER
Passed protocol    Requesting AMLODIPINE BESYLATE-VALSARTAN 5-160 MG Oral Tab  LOV: 2/24/20  RTC: 6 weeks  Last Relevant Labs: 6/18/20  Filled: 5/21/20 #180 with 0 refills    No future appointments.

## 2020-08-06 NOTE — TELEPHONE ENCOUNTER
Failed protocol for Metformin and Montelukast    Medication(s) to Refill:   Requested Prescriptions     Pending Prescriptions Disp Refills   • metFORMIN HCl 1000 MG Oral Tab 180 tablet 0     Sig: Take 1 tablet (1,000 mg total) by mouth 2 (two) times daily.

## 2020-08-24 ENCOUNTER — APPOINTMENT (OUTPATIENT)
Dept: GENERAL RADIOLOGY | Facility: HOSPITAL | Age: 40
End: 2020-08-24
Payer: COMMERCIAL

## 2020-08-24 ENCOUNTER — HOSPITAL ENCOUNTER (EMERGENCY)
Facility: HOSPITAL | Age: 40
Discharge: HOME | End: 2020-08-24
Attending: EMERGENCY MEDICINE
Payer: COMMERCIAL

## 2020-08-24 VITALS
SYSTOLIC BLOOD PRESSURE: 130 MMHG | RESPIRATION RATE: 20 BRPM | BODY MASS INDEX: 44.1 KG/M2 | HEIGHT: 71 IN | OXYGEN SATURATION: 95 % | HEART RATE: 90 BPM | DIASTOLIC BLOOD PRESSURE: 86 MMHG | WEIGHT: 315 LBS | TEMPERATURE: 98 F

## 2020-08-24 DIAGNOSIS — J06.9 UPPER RESPIRATORY TRACT INFECTION, UNSPECIFIED TYPE: Primary | ICD-10-CM

## 2020-08-24 LAB
ATRIAL RATE: 83 BPM
P AXIS: 53 DEGREES
P-R INTERVAL: 176 MS
Q-T INTERVAL: 368 MS
QRS DURATION: 92 MS
QTC CALCULATION (BEZET): 432 MS
R AXIS: 61 DEGREES
T AXIS: 42 DEGREES
VENTRICULAR RATE: 83 BPM

## 2020-08-24 PROCEDURE — 71045 X-RAY EXAM CHEST 1 VIEW: CPT | Performed by: EMERGENCY MEDICINE

## 2020-08-24 PROCEDURE — 93005 ELECTROCARDIOGRAM TRACING: CPT

## 2020-08-24 PROCEDURE — 99285 EMERGENCY DEPT VISIT HI MDM: CPT

## 2020-08-24 PROCEDURE — 99284 EMERGENCY DEPT VISIT MOD MDM: CPT

## 2020-08-24 PROCEDURE — 93010 ELECTROCARDIOGRAM REPORT: CPT

## 2020-08-24 NOTE — ED INITIAL ASSESSMENT (HPI)
Pt feels he may have COVID. He has a history of asthma, is feeling more short of breath, coughing and congested for the past couple days. Denies a known positive exposure. No loss of taste or smell. Reports mild headache.

## 2020-08-24 NOTE — ED PROVIDER NOTES
Patient Seen in: [facility] Emergency Department      History   Patient presents with:  Cough  Testing    Stated Complaint: covid s/s, cough, congestion, weakness started yesterday.  Pt denies contact wit*    HPI     Patient 40-year-old male who stat Current:/86   Pulse 90   Temp 97.8 °F (36.6 °C) (Temporal)   Resp 20   Ht 180.3 cm (5' 11\")   Wt (!) 174.6 kg   SpO2 95%   BMI 53.70 kg/m²         Physical Exam  GENERAL: Patient resting comfortably on the cart in no acute distress.   HEENT: Ex

## 2020-08-26 LAB — SARS-COV-2 RNA RESP QL NAA+PROBE: NOT DETECTED

## 2020-10-15 ENCOUNTER — PATIENT MESSAGE (OUTPATIENT)
Dept: INTERNAL MEDICINE CLINIC | Facility: CLINIC | Age: 40
End: 2020-10-15

## 2020-10-15 NOTE — TELEPHONE ENCOUNTER
From: Mookie Neville  To: Preston Mobley MD  Sent: 10/15/2020 10:27 AM CDT  Subject: Non-Urgent Medical Question    Hello,    Due to sexual inter course over the course of a few days in a row, my penis became a little raw. This happened about two weeks ago.

## 2020-10-22 ENCOUNTER — OFFICE VISIT (OUTPATIENT)
Dept: INTERNAL MEDICINE CLINIC | Facility: CLINIC | Age: 40
End: 2020-10-22
Payer: COMMERCIAL

## 2020-10-22 VITALS
DIASTOLIC BLOOD PRESSURE: 86 MMHG | HEART RATE: 100 BPM | TEMPERATURE: 98 F | SYSTOLIC BLOOD PRESSURE: 124 MMHG | BODY MASS INDEX: 55 KG/M2 | WEIGHT: 315 LBS

## 2020-10-22 DIAGNOSIS — E11.9 TYPE 2 DIABETES MELLITUS WITHOUT COMPLICATION (HCC): ICD-10-CM

## 2020-10-22 DIAGNOSIS — E11.9 TYPE 2 DIABETES MELLITUS WITHOUT COMPLICATION, UNSPECIFIED WHETHER LONG TERM INSULIN USE (HCC): ICD-10-CM

## 2020-10-22 DIAGNOSIS — N48.1 BALANITIS: Primary | ICD-10-CM

## 2020-10-22 DIAGNOSIS — I10 ESSENTIAL HYPERTENSION: Chronic | ICD-10-CM

## 2020-10-22 PROCEDURE — 3079F DIAST BP 80-89 MM HG: CPT | Performed by: INTERNAL MEDICINE

## 2020-10-22 PROCEDURE — 99213 OFFICE O/P EST LOW 20 MIN: CPT | Performed by: INTERNAL MEDICINE

## 2020-10-22 PROCEDURE — 3074F SYST BP LT 130 MM HG: CPT | Performed by: INTERNAL MEDICINE

## 2020-10-22 RX ORDER — AMLODIPINE AND VALSARTAN 5; 160 MG/1; MG/1
2 TABLET ORAL DAILY
Qty: 180 TABLET | Refills: 1 | Status: SHIPPED | OUTPATIENT
Start: 2020-10-22 | End: 2020-12-24

## 2020-10-22 RX ORDER — CLOTRIMAZOLE AND BETAMETHASONE DIPROPIONATE 10; .64 MG/G; MG/G
1 CREAM TOPICAL 2 TIMES DAILY
Qty: 60 G | Refills: 3 | Status: SHIPPED | OUTPATIENT
Start: 2020-10-22 | End: 2020-12-21

## 2020-10-22 NOTE — PATIENT INSTRUCTIONS
Balanitis     Balanitis is an inflammation of the head of the penis. It can happen if there is a buildup of germs under the foreskin. These germs could be bacteria, viruses, or fungi. It can also occur from exposure to soaps and other chemicals.  In adult © 9227-9508 [employer] [address]. [address] [facility], [facility] [place]. All rights reserved. This information is not intended as a substitute for professional medical care. Always follow your healthcare professional's instructions.

## 2020-10-22 NOTE — PROGRESS NOTES
Mookie Neville  1980 is a 41year old male. No chief complaint on file. HPI:   Patient presents with rash on penis with some burning monogamous  Over-the-counter medicines along with topical antibiotics with further worsening.   Current Outpat Types: Cannabis      Comment: occ marijuana        REVIEW OF SYSTEMS:   GENERAL HEALTH: feels well otherwise  SKIN: denies any unusual skin lesions or rashes  RESPIRATORY: no shortness of breath with exertion  CARDIOVASCULAR: denies chest pain on exert If not treated right away, this can lead to a condition called phimosis. This means you can't pull back the foreskin from the head of the penis.   Symptoms of balanitis may include:  · Penis pain or soreness  · Discharge  · Inability to retract the foreskin The patient is asked to Return in about 4 weeks (around 11/19/2020). Krystin Wynn MD

## 2020-10-23 RX ORDER — OMEPRAZOLE 20 MG/1
CAPSULE, DELAYED RELEASE ORAL
Qty: 90 CAPSULE | Refills: 0 | Status: SHIPPED | OUTPATIENT
Start: 2020-10-23 | End: 2021-01-07

## 2020-10-23 RX ORDER — MONTELUKAST SODIUM 10 MG/1
TABLET ORAL
Qty: 90 TABLET | Refills: 0 | Status: SHIPPED | OUTPATIENT
Start: 2020-10-23 | End: 2021-01-12

## 2020-10-23 RX ORDER — TRIAMTERENE AND HYDROCHLOROTHIAZIDE 37.5; 25 MG/1; MG/1
CAPSULE ORAL
Qty: 90 CAPSULE | Refills: 0 | Status: SHIPPED | OUTPATIENT
Start: 2020-10-23 | End: 2021-01-12

## 2020-10-23 NOTE — TELEPHONE ENCOUNTER
Passed protocol for Triamterene    Medication(s) to Refill:   Requested Prescriptions     Pending Prescriptions Disp Refills   • METFORMIN HCL 1000 MG Oral Tab [Pharmacy Med Name: METFORMIN 1000MG TABLETS] 180 tablet 0     Sig: TAKE 1 TABLET BY MOUTH TWICE

## 2020-12-19 ENCOUNTER — PATIENT MESSAGE (OUTPATIENT)
Dept: INTERNAL MEDICINE CLINIC | Facility: CLINIC | Age: 40
End: 2020-12-19

## 2020-12-19 DIAGNOSIS — E11.9 TYPE 2 DIABETES MELLITUS WITHOUT COMPLICATION (HCC): ICD-10-CM

## 2020-12-19 DIAGNOSIS — N48.1 BALANITIS: ICD-10-CM

## 2020-12-21 RX ORDER — ATORVASTATIN CALCIUM 20 MG/1
20 TABLET, FILM COATED ORAL DAILY
Qty: 90 TABLET | Refills: 0 | Status: SHIPPED | OUTPATIENT
Start: 2020-12-21 | End: 2021-03-09

## 2020-12-21 RX ORDER — CLOTRIMAZOLE AND BETAMETHASONE DIPROPIONATE 10; .64 MG/G; MG/G
1 CREAM TOPICAL 2 TIMES DAILY
Qty: 60 G | Refills: 3 | Status: SHIPPED | OUTPATIENT
Start: 2020-12-21 | End: 2021-03-04

## 2020-12-21 NOTE — TELEPHONE ENCOUNTER
From: Hermelindo Pitt  To: Ana Perez MD  Sent: 12/19/2020 3:26 PM CST  Subject: Prescription Question    I have used all 4 tubes of the prescription I received for my yeast infection on my penis. I need another prescription refill for this.

## 2020-12-21 NOTE — TELEPHONE ENCOUNTER
Medication(s) to Refill:   Requested Prescriptions     Pending Prescriptions Disp Refills   • clotrimazole-betamethasone 1-0.05 % External Cream 60 g 3     Sig: Apply 1 Application topically 2 (two) times daily.        LOV: 10/22/2020    RTC:  4 weeks

## 2020-12-23 ENCOUNTER — PATIENT MESSAGE (OUTPATIENT)
Dept: INTERNAL MEDICINE CLINIC | Facility: CLINIC | Age: 40
End: 2020-12-23

## 2020-12-23 NOTE — TELEPHONE ENCOUNTER
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

## 2020-12-23 NOTE — TELEPHONE ENCOUNTER
Does anyone know when the vaccine is coming for the non frontline workers ?   I don't  They have to wait for guidance from the local health dept or the news

## 2020-12-23 NOTE — TELEPHONE ENCOUNTER
From: Amelia Ruiz  To: Marni Matthews MD  Sent: 12/23/2020 4:22 PM CST  Subject: Prescription Question    This is the second time I'm sending this message:    I have used all 4 tubes of the prescription I received for my yeast infection on my penis.  I ne

## 2020-12-24 DIAGNOSIS — I10 ESSENTIAL HYPERTENSION: Chronic | ICD-10-CM

## 2020-12-28 RX ORDER — AMLODIPINE AND VALSARTAN 5; 160 MG/1; MG/1
2 TABLET ORAL DAILY
Qty: 180 TABLET | Refills: 1 | Status: SHIPPED | OUTPATIENT
Start: 2020-12-28 | End: 2021-06-01

## 2020-12-28 NOTE — TELEPHONE ENCOUNTER
Passed protocol   Requesting amLODIPine Besylate-Valsartan 5-160 MG   LOV: 10/22/2020  RTC: 4 weeks   Last Relevant Labs: 7/6/2020  Filled: 10/22/2020  #180 with 0 refills    No future appointments.

## 2021-01-07 RX ORDER — OMEPRAZOLE 20 MG/1
CAPSULE, DELAYED RELEASE ORAL
Qty: 90 CAPSULE | Refills: 0 | Status: SHIPPED | OUTPATIENT
Start: 2021-01-07 | End: 2021-03-31

## 2021-01-07 NOTE — TELEPHONE ENCOUNTER
Medication(s) to Refill:   Requested Prescriptions     Pending Prescriptions Disp Refills   • OMEPRAZOLE 20 MG Oral Capsule Delayed Release [Pharmacy Med Name: OMEPRAZOLE 20MG CAPSULES] 90 capsule 0     Sig: TAKE ONE CAPSULE BY MOUTH EVERY DAY       LOV: 1

## 2021-01-11 DIAGNOSIS — E11.9 TYPE 2 DIABETES MELLITUS WITHOUT COMPLICATION (HCC): ICD-10-CM

## 2021-01-12 RX ORDER — TRIAMTERENE AND HYDROCHLOROTHIAZIDE 37.5; 25 MG/1; MG/1
1 CAPSULE ORAL EVERY MORNING
Qty: 90 CAPSULE | Refills: 0 | Status: SHIPPED | OUTPATIENT
Start: 2021-01-12 | End: 2021-03-31

## 2021-01-12 RX ORDER — MONTELUKAST SODIUM 10 MG/1
10 TABLET ORAL DAILY
Qty: 90 TABLET | Refills: 0 | Status: SHIPPED | OUTPATIENT
Start: 2021-01-12 | End: 2021-04-01

## 2021-01-12 NOTE — TELEPHONE ENCOUNTER
Failed protocol for montelukast    Medication(s) to Refill:   Requested Prescriptions     Pending Prescriptions Disp Refills   • Triamterene-HCTZ 37.5-25 MG Oral Cap 90 capsule 0     Sig: Take 1 capsule by mouth every morning.    • Montelukast Sodium 10 MG

## 2021-03-03 ENCOUNTER — PATIENT MESSAGE (OUTPATIENT)
Dept: INTERNAL MEDICINE CLINIC | Facility: CLINIC | Age: 41
End: 2021-03-03

## 2021-03-03 DIAGNOSIS — N48.1 BALANITIS: ICD-10-CM

## 2021-03-04 RX ORDER — CLOTRIMAZOLE AND BETAMETHASONE DIPROPIONATE 10; .64 MG/G; MG/G
1 CREAM TOPICAL 2 TIMES DAILY
Qty: 60 G | Refills: 3 | Status: SHIPPED | OUTPATIENT
Start: 2021-03-04 | End: 2021-04-30

## 2021-03-04 NOTE — TELEPHONE ENCOUNTER
Last refill on medication 60g x 3 refills on 12/21/2020 - this was sent to a local [facility]  Patient is asking that a refill be sent to [facility]. Last office visit 10/22/2020  No future appointments.

## 2021-03-04 NOTE — TELEPHONE ENCOUNTER
From: Christy Oneill  To: Shiraz Sykes MD  Sent: 3/3/2021 9:44 PM CST  Subject: Prescription Question    Can I get a refill of the Clotrimazole- betamethasone 1-0.05%? Can you send it to the [facility] [address] [facility], [address] [facility]?

## 2021-03-08 DIAGNOSIS — E11.9 TYPE 2 DIABETES MELLITUS WITHOUT COMPLICATION (HCC): ICD-10-CM

## 2021-03-09 RX ORDER — ATORVASTATIN CALCIUM 20 MG/1
TABLET, FILM COATED ORAL
Qty: 90 TABLET | Refills: 0 | Status: SHIPPED | OUTPATIENT
Start: 2021-03-09 | End: 2021-05-25

## 2021-03-09 NOTE — TELEPHONE ENCOUNTER
Passed protocol    Requesting atorvastatin 20 MG Oral Tab  LOV: 10/22/20  RTC: 4 weeks  Last Relevant Labs: 6/18/20  Filled: 12/21/2020 #90 with 0 refills    No future appointments.

## 2021-03-18 DIAGNOSIS — Z23 NEED FOR VACCINATION: ICD-10-CM

## 2021-03-22 ENCOUNTER — PATIENT MESSAGE (OUTPATIENT)
Dept: INTERNAL MEDICINE CLINIC | Facility: CLINIC | Age: 41
End: 2021-03-22

## 2021-03-23 ENCOUNTER — HOSPITAL ENCOUNTER (OUTPATIENT)
Age: 41
Discharge: HOME | End: 2021-03-23
Attending: EMERGENCY MEDICINE
Payer: COMMERCIAL

## 2021-03-23 ENCOUNTER — APPOINTMENT (OUTPATIENT)
Dept: ULTRASOUND IMAGING | Age: 41
End: 2021-03-23
Attending: EMERGENCY MEDICINE
Payer: COMMERCIAL

## 2021-03-23 VITALS
HEART RATE: 101 BPM | SYSTOLIC BLOOD PRESSURE: 125 MMHG | BODY MASS INDEX: 44.1 KG/M2 | DIASTOLIC BLOOD PRESSURE: 72 MMHG | HEIGHT: 71 IN | OXYGEN SATURATION: 96 % | WEIGHT: 315 LBS | RESPIRATION RATE: 18 BRPM | TEMPERATURE: 99 F

## 2021-03-23 DIAGNOSIS — R50.9 FEVER OF UNKNOWN ORIGIN: ICD-10-CM

## 2021-03-23 DIAGNOSIS — S76.312A HAMSTRING STRAIN, LEFT, INITIAL ENCOUNTER: Primary | ICD-10-CM

## 2021-03-23 LAB
#MXD IC: 1.5 X10ˆ3/UL (ref 0.1–1)
HCT VFR BLD AUTO: 39.2 %
HGB BLD-MCNC: 12.8 G/DL
LYMPHOCYTES # BLD AUTO: 3.4 X10ˆ3/UL (ref 1–4)
LYMPHOCYTES NFR BLD AUTO: 27.2 %
MCH RBC QN AUTO: 29.2 PG (ref 26–34)
MCHC RBC AUTO-ENTMCNC: 32.7 G/DL (ref 31–37)
MCV RBC AUTO: 89.5 FL (ref 80–100)
MIXED CELL %: 11.7 %
NEUTROPHILS # BLD AUTO: 7.5 X10ˆ3/UL (ref 1.5–7.7)
NEUTROPHILS NFR BLD AUTO: 61.1 %
PLATELET # BLD AUTO: 298 X10ˆ3/UL (ref 150–450)
RBC # BLD AUTO: 4.38 X10ˆ6/UL
SARS-COV-2 RNA RESP QL NAA+PROBE: NOT DETECTED
WBC # BLD AUTO: 12.4 X10ˆ3/UL (ref 4–11)

## 2021-03-23 PROCEDURE — 93971 EXTREMITY STUDY: CPT | Performed by: EMERGENCY MEDICINE

## 2021-03-23 PROCEDURE — 99214 OFFICE O/P EST MOD 30 MIN: CPT

## 2021-03-23 PROCEDURE — 85025 COMPLETE CBC W/AUTO DIFF WBC: CPT | Performed by: EMERGENCY MEDICINE

## 2021-03-23 PROCEDURE — 36415 COLL VENOUS BLD VENIPUNCTURE: CPT

## 2021-03-23 RX ORDER — CYCLOBENZAPRINE HCL 10 MG
10 TABLET ORAL 3 TIMES DAILY PRN
Qty: 20 TABLET | Refills: 0 | Status: SHIPPED | OUTPATIENT
Start: 2021-03-23 | End: 2021-03-30

## 2021-03-23 NOTE — ED INITIAL ASSESSMENT (HPI)
Pt tripped over his dog's bone 2/18 and fell on his hardwood floor, injured his left leg, he felt like he did the splits, and now is severely bruised and leg is still painful. He did not hit head or consciousness.   The next dorothy went to a football game, an

## 2021-03-23 NOTE — ED PROVIDER NOTES
Patient Seen in: [facility]      History   Patient presents with:  Contusion    Stated Complaint: back of thigh pain poss blood clot/fever/nausea/fatigue     HPI/Subjective:   HPI    Patient is a pleasant 24-year-old male, presenting for Exam     ED Triage Vitals [03/23/21 1233]   /79   Pulse 103   Resp 18   Temp 99.2 °F (37.3 °C)   Temp src Temporal   SpO2 95 %   O2 Device None (Room air)       Current:/72   Pulse 101   Temp 99.2 °F (37.3 °C) (Temporal)   Resp 18   Ht 180.3 cm structures, Doppler spectral analysis, and color flow. Doppler imaging were performed.   The following veins were imaged:  Common, deep, and superficial femoral, popliteal, sapheno-femoral junction, posterior tibial veins, and the contralateral common femo origin    Disposition:  Discharge  3/23/2021  2:36 pm    Follow-up:  Beata Roberts MD  [address] [place] [address]  [address] [facility]  [phone]    Schedule an appointment as soon as possible for a visit in 3 days            Medications Prescribed

## 2021-03-23 NOTE — TELEPHONE ENCOUNTER
Next available Thurs 3/25/21. Can earlier appt be accomodated ? [facility] w/ covid s/s ?

## 2021-03-23 NOTE — TELEPHONE ENCOUNTER
Patient provided with MD instructions listed below. Pt inquired if he is still ok to go to IC with \"covid\" symptoms. C/o increased fatigue, disorientation, muscle aches and low grade temp since Saturday.      Advised pt that he can proceed to IC for eval,

## 2021-03-23 NOTE — TELEPHONE ENCOUNTER
From: Yuridia Narvaez  To: Roberto Burris MD  Sent: 3/22/2021 5:07 PM CDT  Subject: Non-Urgent Medical Question    I fell on Friday morning and tore my left hamstring. Saturday night, I was extremely tired and had a low grade fever (99.7).  Got the fever back

## 2021-03-23 NOTE — TELEPHONE ENCOUNTER
Is difficult to put 2 and 2 together -the patient needs to be checked out to better define the problem.   It does not necessarily mean he has a blood clot

## 2021-03-23 NOTE — TELEPHONE ENCOUNTER
Per , he does not have any availability until this Thursday.   states that pts low grade temp can be r/t hematoma so he is ok with seeing pt in the office if pt is willing to wait or pt will need to proceed to IC for eval.     LMTCB to notify pt of VM's

## 2021-03-29 NOTE — TELEPHONE ENCOUNTER
Appt scheduled    Future Appointments   Date Time Provider Baljit Mathew   3/31/2021 10:30 AM Memory BAMBI Chisholm [facility] [place]

## 2021-03-31 ENCOUNTER — OFFICE VISIT (OUTPATIENT)
Dept: INTERNAL MEDICINE CLINIC | Facility: CLINIC | Age: 41
End: 2021-03-31
Payer: COMMERCIAL

## 2021-03-31 ENCOUNTER — PATIENT MESSAGE (OUTPATIENT)
Dept: INTERNAL MEDICINE CLINIC | Facility: CLINIC | Age: 41
End: 2021-03-31

## 2021-03-31 VITALS
TEMPERATURE: 98 F | OXYGEN SATURATION: 99 % | WEIGHT: 315 LBS | DIASTOLIC BLOOD PRESSURE: 80 MMHG | BODY MASS INDEX: 44.1 KG/M2 | HEART RATE: 106 BPM | SYSTOLIC BLOOD PRESSURE: 115 MMHG | HEIGHT: 71 IN | RESPIRATION RATE: 18 BRPM

## 2021-03-31 DIAGNOSIS — N48.89 PENILE PAIN: ICD-10-CM

## 2021-03-31 DIAGNOSIS — E78.00 PURE HYPERCHOLESTEROLEMIA: ICD-10-CM

## 2021-03-31 DIAGNOSIS — N48.1 BALANITIS: Primary | ICD-10-CM

## 2021-03-31 DIAGNOSIS — E11.65 UNCONTROLLED TYPE 2 DIABETES MELLITUS WITH HYPERGLYCEMIA (HCC): ICD-10-CM

## 2021-03-31 DIAGNOSIS — E66.01 MORBID OBESITY WITH BMI OF 50.0-59.9, ADULT (HCC): ICD-10-CM

## 2021-03-31 DIAGNOSIS — E11.9 TYPE 2 DIABETES MELLITUS WITHOUT COMPLICATION (HCC): ICD-10-CM

## 2021-03-31 DIAGNOSIS — I10 ESSENTIAL HYPERTENSION: ICD-10-CM

## 2021-03-31 DIAGNOSIS — S76.302D LEFT HAMSTRING INJURY, SUBSEQUENT ENCOUNTER: ICD-10-CM

## 2021-03-31 LAB
APPEARANCE: CLEAR
BILIRUBIN: NEGATIVE
CREAT UR-SCNC: 98.9 MG/DL
GLUCOSE (URINE DIPSTICK): 100 MG/DL
LEUKOCYTES: NEGATIVE
MICROALBUMIN UR-MCNC: 6.61 MG/DL
MICROALBUMIN/CREAT 24H UR-RTO: 66.8 UG/MG (ref ?–30)
MULTISTIX LOT#: 1044 NUMERIC
NITRITE, URINE: NEGATIVE
OCCULT BLOOD: NEGATIVE
PH, URINE: 7 (ref 4.5–8)
PROTEIN (URINE DIPSTICK): 30 MG/DL
SPECIFIC GRAVITY: 1.02 (ref 1–1.03)
UROBILINOGEN,SEMI-QN: 0.2 MG/DL (ref 0–1.9)

## 2021-03-31 PROCEDURE — 87591 N.GONORRHOEAE DNA AMP PROB: CPT | Performed by: PHYSICIAN ASSISTANT

## 2021-03-31 PROCEDURE — 82043 UR ALBUMIN QUANTITATIVE: CPT | Performed by: PHYSICIAN ASSISTANT

## 2021-03-31 PROCEDURE — 99214 OFFICE O/P EST MOD 30 MIN: CPT | Performed by: PHYSICIAN ASSISTANT

## 2021-03-31 PROCEDURE — 3008F BODY MASS INDEX DOCD: CPT | Performed by: PHYSICIAN ASSISTANT

## 2021-03-31 PROCEDURE — 3074F SYST BP LT 130 MM HG: CPT | Performed by: PHYSICIAN ASSISTANT

## 2021-03-31 PROCEDURE — 87491 CHLMYD TRACH DNA AMP PROBE: CPT | Performed by: PHYSICIAN ASSISTANT

## 2021-03-31 PROCEDURE — 81002 URINALYSIS NONAUTO W/O SCOPE: CPT | Performed by: PHYSICIAN ASSISTANT

## 2021-03-31 PROCEDURE — 3079F DIAST BP 80-89 MM HG: CPT | Performed by: PHYSICIAN ASSISTANT

## 2021-03-31 PROCEDURE — 82570 ASSAY OF URINE CREATININE: CPT | Performed by: PHYSICIAN ASSISTANT

## 2021-03-31 RX ORDER — TRIAMTERENE AND HYDROCHLOROTHIAZIDE 37.5; 25 MG/1; MG/1
CAPSULE ORAL
Qty: 90 CAPSULE | Refills: 0 | Status: SHIPPED | OUTPATIENT
Start: 2021-03-31 | End: 2021-06-07

## 2021-03-31 RX ORDER — OMEPRAZOLE 20 MG/1
CAPSULE, DELAYED RELEASE ORAL
Qty: 90 CAPSULE | Refills: 0 | Status: SHIPPED | OUTPATIENT
Start: 2021-03-31 | End: 2021-06-07

## 2021-03-31 RX ORDER — CYCLOBENZAPRINE HCL 10 MG
10 TABLET ORAL NIGHTLY PRN
Qty: 10 TABLET | Refills: 0 | Status: SHIPPED | OUTPATIENT
Start: 2021-03-31 | End: 2021-10-13

## 2021-03-31 NOTE — PROGRESS NOTES
Ana Luisa Diego is a 41year old male. HPI:   Patient presents for f/u from recent IC visit. On 3/21/21 pt slipped on his dog's bone. As he fell he felt a tear in his L hamstring / buttock. +immediate bruising and swelling.   C/o pain with sitting and MG Oral Tab Take 325 mg by mouth daily.          Past Medical History:   Diagnosis Date   • CPAP (continuous positive airway pressure) dependence    • GERD (gastroesophageal reflux disease)    • Hay fever    • Headache    • SIA (obstructive sleep apnea) hamstring is tender to palpation. No obvious defect in muscle but difficult to assess d/t body habitus. Pain with hip flexion. : no rashes or skin lesions, redness and mild swelling of the glans with scant amount of clear penile discharge noted.   No t

## 2021-03-31 NOTE — TELEPHONE ENCOUNTER
From: Priti Mack  To: BAMBI Garcia  Sent: 3/31/2021 11:21 AM CDT  Subject: Prescription Question    I had asked for a refill on the muscle relaxers I was prescribed last week, but do not notice it on my summary. Alexy Nasima said she would refill it.

## 2021-03-31 NOTE — PATIENT INSTRUCTIONS
Please follow up with orthopedics regarding hamstring injury asap.     Penile pain:  - start antibiotic cream (metronidazole) - thin layer applied twice a day x 7 days  - if no improvement in one week please see urology    Please complete fasting blood work

## 2021-04-01 RX ORDER — MONTELUKAST SODIUM 10 MG/1
10 TABLET ORAL DAILY
Qty: 90 TABLET | Refills: 0 | Status: SHIPPED | OUTPATIENT
Start: 2021-04-01 | End: 2021-06-16

## 2021-04-01 NOTE — TELEPHONE ENCOUNTER
Protocol failed   Medication(s) to Refill:   Requested Prescriptions     Pending Prescriptions Disp Refills   • metFORMIN HCl 1000 MG Oral Tab 180 tablet 0     Sig: Take 1 tablet (1,000 mg total) by mouth 2 (two) times daily.    • Montelukast Sodium 10 MG O

## 2021-04-08 ENCOUNTER — ORDER TRANSCRIPTION (OUTPATIENT)
Dept: ADMINISTRATIVE | Facility: HOSPITAL | Age: 41
End: 2021-04-08

## 2021-04-08 DIAGNOSIS — M25.552 PAIN IN LEFT HIP: Primary | ICD-10-CM

## 2021-04-29 ENCOUNTER — PATIENT MESSAGE (OUTPATIENT)
Dept: INTERNAL MEDICINE CLINIC | Facility: CLINIC | Age: 41
End: 2021-04-29

## 2021-04-29 DIAGNOSIS — N48.1 BALANITIS: ICD-10-CM

## 2021-04-29 NOTE — TELEPHONE ENCOUNTER
From: Gómez Burns  To: Kayleigh Monson MD  Sent: 4/29/2021 7:07 AM CDT  Subject: Prescription Question    Can I please get a refill of the Clotrimazole- betamethasone 1-0.05%?

## 2021-04-30 RX ORDER — CLOTRIMAZOLE AND BETAMETHASONE DIPROPIONATE 10; .64 MG/G; MG/G
1 CREAM TOPICAL 2 TIMES DAILY
Qty: 60 G | Refills: 3 | Status: SHIPPED | OUTPATIENT
Start: 2021-04-30 | End: 2021-04-30

## 2021-04-30 RX ORDER — CLOTRIMAZOLE AND BETAMETHASONE DIPROPIONATE 10; .64 MG/G; MG/G
1 CREAM TOPICAL 2 TIMES DAILY
Qty: 60 G | Refills: 0 | Status: SHIPPED | OUTPATIENT
Start: 2021-04-30 | End: 2021-05-30

## 2021-04-30 NOTE — TELEPHONE ENCOUNTER
Patient is calling back to check on the status of getting a prescription for a yeast infection. Patient stated that he will be leaving for out of town at 6:00pm today.

## 2021-05-03 ENCOUNTER — HOSPITAL ENCOUNTER (OUTPATIENT)
Age: 41
Discharge: HOME | End: 2021-05-03
Payer: COMMERCIAL

## 2021-05-03 ENCOUNTER — APPOINTMENT (OUTPATIENT)
Dept: GENERAL RADIOLOGY | Age: 41
End: 2021-05-03
Attending: PHYSICIAN ASSISTANT
Payer: COMMERCIAL

## 2021-05-03 VITALS
TEMPERATURE: 98 F | DIASTOLIC BLOOD PRESSURE: 77 MMHG | OXYGEN SATURATION: 96 % | RESPIRATION RATE: 16 BRPM | SYSTOLIC BLOOD PRESSURE: 134 MMHG | HEART RATE: 86 BPM

## 2021-05-03 DIAGNOSIS — J40 WHEEZY BRONCHITIS: Primary | ICD-10-CM

## 2021-05-03 DIAGNOSIS — Z20.822 LAB TEST NEGATIVE FOR COVID-19 VIRUS: ICD-10-CM

## 2021-05-03 PROCEDURE — 99214 OFFICE O/P EST MOD 30 MIN: CPT

## 2021-05-03 PROCEDURE — 71046 X-RAY EXAM CHEST 2 VIEWS: CPT | Performed by: PHYSICIAN ASSISTANT

## 2021-05-03 PROCEDURE — 99213 OFFICE O/P EST LOW 20 MIN: CPT

## 2021-05-03 RX ORDER — ALBUTEROL SULFATE 90 UG/1
2 AEROSOL, METERED RESPIRATORY (INHALATION) EVERY 4 HOURS PRN
Qty: 1 INHALER | Refills: 0 | Status: SHIPPED | OUTPATIENT
Start: 2021-05-03 | End: 2021-06-02

## 2021-05-03 RX ORDER — DEXAMETHASONE 4 MG/1
16 TABLET ORAL ONCE
Status: COMPLETED | OUTPATIENT
Start: 2021-05-03 | End: 2021-05-03

## 2021-05-03 NOTE — ED PROVIDER NOTES
Patient Seen in: [facility]      History   Patient presents with:  Covid-19 Test    Stated Complaint: cough / congestion / sorethroat / runny nose x 5 days    HPI/Subjective:   HPI    76-year-old male here with complaint of cough congesti (36.6 °C)   Temp src Temporal   SpO2 94 %   O2 Device None (Room air)       Current:/77   Pulse 86   Temp 97.9 °F (36.6 °C) (Temporal)   Resp 16   SpO2 94%         Physical Exam  Vitals and nursing note reviewed.    Constitutional:       Appearance: H pneumothorax or pleural effusion. CONCLUSION:  No focal consolidation.     Dictated by (CST): Rio Moe MD on 5/03/2021 at 10:03 AM     Finalized by (CST): Rio Moe MD on 5/03/2021 at 10:04 AM     I personally reviewed the xray images and radi medications    ! ! Albuterol Sulfate  (90 Base) MCG/ACT Inhalation Aero Soln  Inhale 2 puffs into the lungs every 4 (four) hours as needed for Wheezing. Qty: 1 Inhaler Refills: 0    !! - Potential duplicate medications found.  Please discuss with pro

## 2021-05-03 NOTE — ED INITIAL ASSESSMENT (HPI)
Pt has had 2 Moderna shote, but has had cough and congestion and needs a negative covid to return to work

## 2021-05-09 ENCOUNTER — PATIENT MESSAGE (OUTPATIENT)
Dept: INTERNAL MEDICINE CLINIC | Facility: CLINIC | Age: 41
End: 2021-05-09

## 2021-05-09 DIAGNOSIS — E66.01 MORBID OBESITY WITH BMI OF 50.0-59.9, ADULT (HCC): ICD-10-CM

## 2021-05-09 DIAGNOSIS — I10 ESSENTIAL HYPERTENSION: Primary | ICD-10-CM

## 2021-05-10 NOTE — TELEPHONE ENCOUNTER
From: Terrell Cohen  To: Gladis Rizzo MD  Sent: 5/9/2021 3:35 PM CDT  Subject: Referral Request    I would like to get an ultrasound of my heart. Do I need a referral for this?

## 2021-05-10 NOTE — TELEPHONE ENCOUNTER
Please let the patient know an echocardiogram is a reasonable test but not the ultimate tests. It may be advisable to at least have a telephone encounter or a visit so that we can discuss what is the most optimal test for him if patient is agreeable.   But

## 2021-05-24 DIAGNOSIS — E11.9 TYPE 2 DIABETES MELLITUS WITHOUT COMPLICATION (HCC): ICD-10-CM

## 2021-05-25 RX ORDER — ATORVASTATIN CALCIUM 20 MG/1
TABLET, FILM COATED ORAL
Qty: 90 TABLET | Refills: 0 | Status: SHIPPED | OUTPATIENT
Start: 2021-05-25 | End: 2021-10-22

## 2021-05-27 NOTE — TELEPHONE ENCOUNTER
Medication(s) to Refill:   Requested Prescriptions     Pending Prescriptions Disp Refills   • metFORMIN HCl 1000 MG Oral Tab 180 tablet 0     Sig: Take 1 tablet (1,000 mg total) by mouth 2 (two) times daily.        LOV:  10/22/2020    RTC:  None noted    La
86

## 2021-05-31 DIAGNOSIS — I10 ESSENTIAL HYPERTENSION: Chronic | ICD-10-CM

## 2021-06-01 RX ORDER — AMLODIPINE AND VALSARTAN 5; 160 MG/1; MG/1
TABLET ORAL
Qty: 60 TABLET | Refills: 0 | Status: SHIPPED | OUTPATIENT
Start: 2021-06-01 | End: 2021-06-04

## 2021-06-01 NOTE — TELEPHONE ENCOUNTER
LVMTCB   Pt due for px     Protocol passed  Medication(s) to Refill:   Requested Prescriptions     Pending Prescriptions Disp Refills   • AMLODIPINE BESYLATE-VALSARTAN 5-160 MG Oral Tab [Pharmacy Med Name: AMLODIPINE-VALSARTAN 5-160MG TABS] 180 tablet 1

## 2021-06-04 RX ORDER — AMLODIPINE AND VALSARTAN 5; 160 MG/1; MG/1
2 TABLET ORAL DAILY
Qty: 120 TABLET | Refills: 0 | Status: SHIPPED | OUTPATIENT
Start: 2021-06-04 | End: 2021-07-26

## 2021-06-04 NOTE — TELEPHONE ENCOUNTER
Received fax from [employer] stating that pt needs minimum of 60 days for ins to cover. Pt is due for labs and blood work - pharmacy notified of this. Ok to give 60 day supply?

## 2021-06-07 ENCOUNTER — LAB ENCOUNTER (OUTPATIENT)
Dept: LAB | Age: 41
End: 2021-06-07
Attending: INTERNAL MEDICINE
Payer: COMMERCIAL

## 2021-06-07 ENCOUNTER — OFFICE VISIT (OUTPATIENT)
Dept: INTERNAL MEDICINE CLINIC | Facility: CLINIC | Age: 41
End: 2021-06-07
Payer: COMMERCIAL

## 2021-06-07 VITALS
WEIGHT: 315 LBS | BODY MASS INDEX: 43.61 KG/M2 | HEIGHT: 71.25 IN | DIASTOLIC BLOOD PRESSURE: 88 MMHG | SYSTOLIC BLOOD PRESSURE: 128 MMHG | OXYGEN SATURATION: 99 % | RESPIRATION RATE: 18 BRPM | TEMPERATURE: 99 F | HEART RATE: 103 BPM

## 2021-06-07 DIAGNOSIS — Z00.00 ROUTINE GENERAL MEDICAL EXAMINATION AT A HEALTH CARE FACILITY: Primary | ICD-10-CM

## 2021-06-07 DIAGNOSIS — I10 ESSENTIAL HYPERTENSION: ICD-10-CM

## 2021-06-07 DIAGNOSIS — E66.01 MORBID OBESITY WITH BMI OF 50.0-59.9, ADULT (HCC): ICD-10-CM

## 2021-06-07 DIAGNOSIS — E78.00 PURE HYPERCHOLESTEROLEMIA: ICD-10-CM

## 2021-06-07 DIAGNOSIS — E11.9 TYPE 2 DIABETES MELLITUS WITHOUT COMPLICATION, UNSPECIFIED WHETHER LONG TERM INSULIN USE (HCC): ICD-10-CM

## 2021-06-07 DIAGNOSIS — Z00.00 ROUTINE GENERAL MEDICAL EXAMINATION AT A HEALTH CARE FACILITY: ICD-10-CM

## 2021-06-07 DIAGNOSIS — I10 ESSENTIAL HYPERTENSION: Chronic | ICD-10-CM

## 2021-06-07 PROCEDURE — 3060F POS MICROALBUMINURIA REV: CPT | Performed by: INTERNAL MEDICINE

## 2021-06-07 PROCEDURE — 81003 URINALYSIS AUTO W/O SCOPE: CPT | Performed by: INTERNAL MEDICINE

## 2021-06-07 PROCEDURE — 3079F DIAST BP 80-89 MM HG: CPT | Performed by: INTERNAL MEDICINE

## 2021-06-07 PROCEDURE — 3008F BODY MASS INDEX DOCD: CPT | Performed by: INTERNAL MEDICINE

## 2021-06-07 PROCEDURE — 3061F NEG MICROALBUMINURIA REV: CPT | Performed by: INTERNAL MEDICINE

## 2021-06-07 PROCEDURE — 80050 GENERAL HEALTH PANEL: CPT | Performed by: INTERNAL MEDICINE

## 2021-06-07 PROCEDURE — 82043 UR ALBUMIN QUANTITATIVE: CPT | Performed by: INTERNAL MEDICINE

## 2021-06-07 PROCEDURE — 83036 HEMOGLOBIN GLYCOSYLATED A1C: CPT | Performed by: INTERNAL MEDICINE

## 2021-06-07 PROCEDURE — 3074F SYST BP LT 130 MM HG: CPT | Performed by: INTERNAL MEDICINE

## 2021-06-07 PROCEDURE — 84436 ASSAY OF TOTAL THYROXINE: CPT | Performed by: INTERNAL MEDICINE

## 2021-06-07 PROCEDURE — 99396 PREV VISIT EST AGE 40-64: CPT | Performed by: INTERNAL MEDICINE

## 2021-06-07 PROCEDURE — 82570 ASSAY OF URINE CREATININE: CPT | Performed by: INTERNAL MEDICINE

## 2021-06-07 PROCEDURE — 80061 LIPID PANEL: CPT | Performed by: INTERNAL MEDICINE

## 2021-06-07 RX ORDER — TRIAMTERENE AND HYDROCHLOROTHIAZIDE 37.5; 25 MG/1; MG/1
1 CAPSULE ORAL EVERY MORNING
Qty: 90 CAPSULE | Refills: 1 | Status: SHIPPED | OUTPATIENT
Start: 2021-06-07 | End: 2021-11-21

## 2021-06-07 RX ORDER — OMEPRAZOLE 20 MG/1
20 CAPSULE, DELAYED RELEASE ORAL DAILY
Qty: 90 CAPSULE | Refills: 1 | Status: SHIPPED | OUTPATIENT
Start: 2021-06-07 | End: 2021-11-17

## 2021-06-07 RX ORDER — ALBUTEROL SULFATE 90 UG/1
2 AEROSOL, METERED RESPIRATORY (INHALATION) EVERY 6 HOURS PRN
Qty: 1 EACH | Refills: 1 | Status: SHIPPED | OUTPATIENT
Start: 2021-06-07 | End: 2021-06-11

## 2021-06-07 NOTE — PROGRESS NOTES
Yuridia Narvaez  1980 is a 35year old male. Patient presents with:  Physical      HPI:   No new cc  Current Outpatient Medications   Medication Sig Dispense Refill   • Triamterene-HCTZ 37.5-25 MG Oral Cap Take 1 capsule by mouth every morning.  80 used    Alcohol use: Yes      Comment: occ    Drug use: Yes      Types: Cannabis      Comment: occ marijuana       REVIEW OF SYSTEMS:     General/Constitutional:   General able to do usual activities, good exercise tolerance, good general state of health, varicosities, no claudication. Dermatologic:   Rash none. Neurologic:   Patient denies dizziness, fainting, headache, loss of consciousness, memory loss, seizures, paresthesias, weakness. Tingling/numbness none. Trouble with balance none.   Does see  MUSCULOSKELETAL:   Cervical spines: normal.   L-S spines: normal.   Lower extremity joints: normal.  OA knees upper extremity joints: normal.   NEUROLOGICAL:   Babinski: negative. All other reflexes are normal.   Cerebellar Testing grossly/intact: yes. Tania Alvarado the lungs every 6 (six) hours as needed for Wheezing. Patient Instructions   To me after all tests echocardiogram and CT calcium     The patient indicates understanding of these issues and agrees to the plan.   The patient is asked to Return in about

## 2021-06-11 RX ORDER — ALBUTEROL SULFATE 90 UG/1
2 AEROSOL, METERED RESPIRATORY (INHALATION) EVERY 6 HOURS PRN
Qty: 3 EACH | Refills: 1 | Status: SHIPPED | OUTPATIENT
Start: 2021-06-11

## 2021-06-11 NOTE — TELEPHONE ENCOUNTER
Albuterol inhaler filled on 6/7/21 and sent to [facility] - 90 day rx required.  Rx re-ordred to reflect a 90 day supply

## 2021-06-16 DIAGNOSIS — E11.9 TYPE 2 DIABETES MELLITUS WITHOUT COMPLICATION (HCC): ICD-10-CM

## 2021-06-16 RX ORDER — MONTELUKAST SODIUM 10 MG/1
TABLET ORAL
Qty: 90 TABLET | Refills: 0 | Status: SHIPPED | OUTPATIENT
Start: 2021-06-16 | End: 2021-09-02

## 2021-06-16 NOTE — TELEPHONE ENCOUNTER
Failed protocol for metformin - Last HgBA1C < 7.5      Requesting metFORMIN HCl 1000 MG Oral Tab  LOV: 6/7/21  RTC: 4 weeks  Last Relevant Labs: 6/7/21  Filled: 4/1/21 #180 with 0 refills      Requesting Montelukast Sodium 10 MG Oral Tab  Filled: 4/1/21 #9

## 2021-07-26 DIAGNOSIS — I10 ESSENTIAL HYPERTENSION: Chronic | ICD-10-CM

## 2021-07-26 RX ORDER — AMLODIPINE AND VALSARTAN 5; 160 MG/1; MG/1
2 TABLET ORAL DAILY
Qty: 180 TABLET | Refills: 0 | Status: SHIPPED | OUTPATIENT
Start: 2021-07-26 | End: 2021-11-21

## 2021-08-12 ENCOUNTER — OFFICE VISIT (OUTPATIENT)
Dept: INTERNAL MEDICINE CLINIC | Facility: CLINIC | Age: 41
End: 2021-08-12
Payer: COMMERCIAL

## 2021-08-12 ENCOUNTER — LAB ENCOUNTER (OUTPATIENT)
Dept: LAB | Age: 41
End: 2021-08-12
Attending: INTERNAL MEDICINE
Payer: COMMERCIAL

## 2021-08-12 VITALS
RESPIRATION RATE: 16 BRPM | HEIGHT: 70.5 IN | HEART RATE: 80 BPM | WEIGHT: 315 LBS | SYSTOLIC BLOOD PRESSURE: 122 MMHG | BODY MASS INDEX: 44.59 KG/M2 | DIASTOLIC BLOOD PRESSURE: 82 MMHG

## 2021-08-12 DIAGNOSIS — E11.9 TYPE 2 DIABETES MELLITUS WITHOUT COMPLICATION, UNSPECIFIED WHETHER LONG TERM INSULIN USE (HCC): ICD-10-CM

## 2021-08-12 DIAGNOSIS — F17.210 CIGARETTE SMOKER: ICD-10-CM

## 2021-08-12 DIAGNOSIS — E66.01 MORBID OBESITY WITH BMI OF 50.0-59.9, ADULT (HCC): ICD-10-CM

## 2021-08-12 DIAGNOSIS — Z51.81 THERAPEUTIC DRUG MONITORING: ICD-10-CM

## 2021-08-12 DIAGNOSIS — I10 ESSENTIAL HYPERTENSION: ICD-10-CM

## 2021-08-12 DIAGNOSIS — E78.00 PURE HYPERCHOLESTEROLEMIA: ICD-10-CM

## 2021-08-12 DIAGNOSIS — G47.33 OSA (OBSTRUCTIVE SLEEP APNEA): ICD-10-CM

## 2021-08-12 DIAGNOSIS — R14.0 ABDOMINAL BLOATING: ICD-10-CM

## 2021-08-12 DIAGNOSIS — E66.01 MORBID OBESITY WITH BMI OF 50.0-59.9, ADULT (HCC): Primary | ICD-10-CM

## 2021-08-12 LAB
EST. AVERAGE GLUCOSE BLD GHB EST-MCNC: 329 MG/DL (ref 68–126)
FOLATE SERPL-MCNC: 23.9 NG/ML (ref 8.7–?)
HBA1C MFR BLD HPLC: 13.1 % (ref ?–5.7)
IGA SERPL-MCNC: 189 MG/DL (ref 70–312)
VIT B12 SERPL-MCNC: 513 PG/ML (ref 193–986)
VIT D+METAB SERPL-MCNC: 23.6 NG/ML (ref 30–100)

## 2021-08-12 PROCEDURE — 99214 OFFICE O/P EST MOD 30 MIN: CPT | Performed by: INTERNAL MEDICINE

## 2021-08-12 PROCEDURE — 82607 VITAMIN B-12: CPT | Performed by: INTERNAL MEDICINE

## 2021-08-12 PROCEDURE — 3079F DIAST BP 80-89 MM HG: CPT | Performed by: INTERNAL MEDICINE

## 2021-08-12 PROCEDURE — 83036 HEMOGLOBIN GLYCOSYLATED A1C: CPT | Performed by: INTERNAL MEDICINE

## 2021-08-12 PROCEDURE — 3008F BODY MASS INDEX DOCD: CPT | Performed by: INTERNAL MEDICINE

## 2021-08-12 PROCEDURE — 3074F SYST BP LT 130 MM HG: CPT | Performed by: INTERNAL MEDICINE

## 2021-08-12 PROCEDURE — 99406 BEHAV CHNG SMOKING 3-10 MIN: CPT | Performed by: INTERNAL MEDICINE

## 2021-08-12 PROCEDURE — 83516 IMMUNOASSAY NONANTIBODY: CPT | Performed by: INTERNAL MEDICINE

## 2021-08-12 PROCEDURE — 3046F HEMOGLOBIN A1C LEVEL >9.0%: CPT | Performed by: INTERNAL MEDICINE

## 2021-08-12 PROCEDURE — 82746 ASSAY OF FOLIC ACID SERUM: CPT | Performed by: INTERNAL MEDICINE

## 2021-08-12 PROCEDURE — 82306 VITAMIN D 25 HYDROXY: CPT | Performed by: INTERNAL MEDICINE

## 2021-08-12 RX ORDER — SEMAGLUTIDE 1.34 MG/ML
0.5 INJECTION, SOLUTION SUBCUTANEOUS WEEKLY
Qty: 1 EACH | Refills: 0 | Status: SHIPPED | OUTPATIENT
Start: 2021-08-12 | End: 2021-09-21

## 2021-08-12 RX ORDER — SEMAGLUTIDE 1.34 MG/ML
0.25 INJECTION, SOLUTION SUBCUTANEOUS
Qty: 1 EACH | Refills: 0 | COMMUNITY
Start: 2021-08-12 | End: 2021-09-09

## 2021-08-12 NOTE — PATIENT INSTRUCTIONS
Plan:  Factor 75/ Freshly     Continue with medications:   Go to the lab for blood work   Follow up with me in 1 month  Schedule follow up appointments: Isabela Ricks (dietitian) or Morales Krueger (presurgery dietitian)   Check for insurance coverage for d -cured meats (monitor for sodium issues)   -hummus with vegetables  -bean dip with vegetables    FRUIT  Low carb fruit options   Raspberries: Half a cup (60 grams) contains 3 grams of carbs. Blackberries: Half a cup (70 grams) contains 4 grams of carbs.

## 2021-08-12 NOTE — PROGRESS NOTES
HISTORY OF PRESENT ILLNESS  Patient presents with:  Weight Problem: ref by PCP, tried eating right      Akosua Garcia is a 35year old male new to our office today for initiation of medical weight loss program.  Patient presents today with c/o excess weigh bariatric surgery: negative     [address] reviewed: obesity in parent/s or sibling: YES     REVIEW OF SYSTEMS  GENERAL: feels well otherwise,   NECK: denies thickening   LUNGS: denies shortness of breath with exertion, ++ apnea   CARDIOVASCULAR: denies chest suzette 06/07/2021    K 3.7 06/07/2021     06/07/2021    CO2 25.0 06/07/2021     Lab Results   Component Value Date     (H) 06/07/2021    A1C 12.9 (H) 06/07/2021     Lab Results   Component Value Date    CHOLEST 143 06/07/2021    TRIG 166 (H) 06/07/20 file prior to visit.       ASSESSMENT  Initial Weight Data and Goal Weight Loss:       Diagnoses and all orders for this visit:    Morbid obesity with BMI of 50.0-59.9, adult ([facility] [address]  -     CELIAC DISEASE SCREEN REFLEX; Future  -     HEMOGLOBIN A1C; Future  - dietitian   · Reviewed the need to make substantial changes in his health and concern for severe metabolic syndrome  · Trial of ozempic  · -advised of side effects and adverse effects of this medication  · Injection teaching done in OV   · Reviewed meal re [employer] or [employer]) to help you to monitor daily dietary intake and you will be able to see if you are eating the right amount of calories, protein, and carbs.  When you set the evin up chose 1-2 lbs/week as a goal.  2. \"7 minute workout\" to help with exer behavior change goals/methods. Specifically I asked about readiness to quit tobacco.  Advise: We gave clear, specific, and personalized behavior change advice, including information about personal health harms and benefits.  Specifically, he was told that

## 2021-08-13 LAB — TTG IGA SER-ACNC: 0.2 U/ML (ref ?–7)

## 2021-08-31 DIAGNOSIS — E11.9 TYPE 2 DIABETES MELLITUS WITHOUT COMPLICATION (HCC): ICD-10-CM

## 2021-09-01 NOTE — TELEPHONE ENCOUNTER
Medication(s) to Refill:   Requested Prescriptions     Pending Prescriptions Disp Refills   • METFORMIN HCL 1000 MG Oral Tab [Pharmacy Med Name: METFORMIN 1000MG TABLETS] 180 tablet 0     Sig: TAKE 1 TABLET BY MOUTH TWICE DAILY   • MONTELUKAST 10 MG Oral T

## 2021-09-02 RX ORDER — MONTELUKAST SODIUM 10 MG/1
TABLET ORAL
Qty: 90 TABLET | Refills: 0 | Status: SHIPPED | OUTPATIENT
Start: 2021-09-02 | End: 2021-11-21

## 2021-09-21 RX ORDER — SEMAGLUTIDE 1.34 MG/ML
INJECTION, SOLUTION SUBCUTANEOUS
Qty: 1.5 ML | Refills: 0 | Status: SHIPPED | OUTPATIENT
Start: 2021-09-21 | End: 2021-10-22

## 2021-09-21 NOTE — TELEPHONE ENCOUNTER
Requesting   Requested Prescriptions     Pending Prescriptions Disp Refills   • OZEMPIC, 0.25 OR 0.5 MG/DOSE, 2 MG/1.5ML Subcutaneous Solution Pen-injector [Pharmacy Med Name: OZEMPIC 0.25 OR 0.5MG/DOS 1X2MG PEN] 1.5 mL 0     Sig: INJECT 0.5MG INTO THE [place]

## 2021-10-13 ENCOUNTER — OFFICE VISIT (OUTPATIENT)
Dept: INTERNAL MEDICINE CLINIC | Facility: CLINIC | Age: 41
End: 2021-10-13
Payer: COMMERCIAL

## 2021-10-13 VITALS
BODY MASS INDEX: 44.59 KG/M2 | HEART RATE: 80 BPM | HEIGHT: 70.5 IN | DIASTOLIC BLOOD PRESSURE: 78 MMHG | SYSTOLIC BLOOD PRESSURE: 120 MMHG | RESPIRATION RATE: 16 BRPM | WEIGHT: 315 LBS

## 2021-10-13 DIAGNOSIS — E11.9 TYPE 2 DIABETES MELLITUS WITHOUT COMPLICATION, UNSPECIFIED WHETHER LONG TERM INSULIN USE (HCC): ICD-10-CM

## 2021-10-13 DIAGNOSIS — Z72.3 LACK OF PHYSICAL EXERCISE: ICD-10-CM

## 2021-10-13 DIAGNOSIS — I10 ESSENTIAL HYPERTENSION: ICD-10-CM

## 2021-10-13 DIAGNOSIS — E78.00 PURE HYPERCHOLESTEROLEMIA: ICD-10-CM

## 2021-10-13 DIAGNOSIS — G47.33 OSA (OBSTRUCTIVE SLEEP APNEA): ICD-10-CM

## 2021-10-13 DIAGNOSIS — E66.01 MORBID OBESITY WITH BMI OF 50.0-59.9, ADULT (HCC): Primary | ICD-10-CM

## 2021-10-13 PROCEDURE — 3008F BODY MASS INDEX DOCD: CPT | Performed by: INTERNAL MEDICINE

## 2021-10-13 PROCEDURE — 3074F SYST BP LT 130 MM HG: CPT | Performed by: INTERNAL MEDICINE

## 2021-10-13 PROCEDURE — 99214 OFFICE O/P EST MOD 30 MIN: CPT | Performed by: INTERNAL MEDICINE

## 2021-10-13 PROCEDURE — 3078F DIAST BP <80 MM HG: CPT | Performed by: INTERNAL MEDICINE

## 2021-10-13 NOTE — PROGRESS NOTES
HISTORY OF PRESENT ILLNESS  Patient presents with:  Weight Check: down 14 lb      Adonis Qiu is a 35year old male here for follow up in medical weight loss program.     Denies chest pain, shortness of breath, dizziness, blurred vision, headache, parest 106 06/07/2021    GFRAA 123 06/07/2021    CA 9.6 06/07/2021    OSMOCALC 290 06/07/2021    ALKPHO 79 06/07/2021    AST 40 (H) 06/07/2021    ALT 94 (H) 06/07/2021    BILT 0.5 06/07/2021    TP 7.8 06/07/2021    ALB 4.0 06/07/2021    GLOBULIN 3.8 06/07/2021 1  ATORVASTATIN 20 MG Oral Tab, TAKE 1 TABLET BY MOUTH DAILY, Disp: 90 tablet, Rfl: 0  metRONIDAZOLE 0.75 % External Cream, Apply thin layer to AA BID x 7 days. , Disp: 1 Tube, Rfl: 0  busPIRone HCl 15 MG Oral Tab, Take 1 tablet by mouth 2 (two) times daily · Weight Loss consent to treat reviewed and signed n/a     Patient Instructions   freshly      Return in about 6 weeks (around 11/24/2021). Patient verbalizes understanding.     Teri Oconnell MD

## 2021-10-22 DIAGNOSIS — E11.9 TYPE 2 DIABETES MELLITUS WITHOUT COMPLICATION (HCC): ICD-10-CM

## 2021-10-22 RX ORDER — ATORVASTATIN CALCIUM 20 MG/1
20 TABLET, FILM COATED ORAL DAILY
Qty: 90 TABLET | Refills: 0 | Status: SHIPPED | OUTPATIENT
Start: 2021-10-22 | End: 2021-11-21

## 2021-10-22 RX ORDER — ERGOCALCIFEROL 1.25 MG/1
50000 CAPSULE ORAL WEEKLY
Qty: 12 CAPSULE | Refills: 0 | OUTPATIENT
Start: 2021-10-22

## 2021-10-22 NOTE — TELEPHONE ENCOUNTER
Requesting   Requested Prescriptions     Pending Prescriptions Disp Refills   • ergocalciferol 1.25 MG (87180 UT) Oral Cap 12 capsule 0     Sig: Take 1 capsule (50,000 Units total) by mouth once a week.  With food for 12 weeks total then begin OTC Vitamin D

## 2021-10-22 NOTE — TELEPHONE ENCOUNTER
Requesting   Requested Prescriptions     Pending Prescriptions Disp Refills   • OZEMPIC, 0.25 OR 0.5 MG/DOSE, 2 MG/1.5ML Subcutaneous Solution Pen-injector 1.5 mL 0     Sig: Inject 0.5 mg into the skin once a week.      LOV: 10/13/21  RTC: 6 weeks   Filled:

## 2021-10-26 RX ORDER — SEMAGLUTIDE 1.34 MG/ML
0.5 INJECTION, SOLUTION SUBCUTANEOUS WEEKLY
Qty: 1.5 ML | Refills: 0 | Status: SHIPPED | OUTPATIENT
Start: 2021-10-26

## 2021-11-18 RX ORDER — OMEPRAZOLE 20 MG/1
20 CAPSULE, DELAYED RELEASE ORAL DAILY
Qty: 90 CAPSULE | Refills: 1 | Status: SHIPPED | OUTPATIENT
Start: 2021-11-18 | End: 2021-11-20

## 2021-11-18 RX ORDER — OMEPRAZOLE 20 MG/1
20 CAPSULE, DELAYED RELEASE ORAL DAILY
Qty: 90 CAPSULE | Refills: 1 | Status: SHIPPED | OUTPATIENT
Start: 2021-11-18 | End: 2021-11-18

## 2021-11-18 NOTE — TELEPHONE ENCOUNTER
Medication(s) to Refill:   Requested Prescriptions     Pending Prescriptions Disp Refills   • omeprazole 20 MG Oral Capsule Delayed Release 90 capsule 1     Sig: Take 1 capsule (20 mg total) by mouth daily.        LOV: 6-7-2021    RTC: 4 weeks      Last Ref

## 2021-11-20 RX ORDER — OMEPRAZOLE 20 MG/1
20 CAPSULE, DELAYED RELEASE ORAL DAILY
Qty: 15 CAPSULE | Refills: 0 | Status: SHIPPED | OUTPATIENT
Start: 2021-11-20 | End: 2021-11-21

## 2021-11-21 DIAGNOSIS — E11.9 TYPE 2 DIABETES MELLITUS WITHOUT COMPLICATION (HCC): ICD-10-CM

## 2021-11-21 DIAGNOSIS — I10 ESSENTIAL HYPERTENSION: Chronic | ICD-10-CM

## 2021-11-23 RX ORDER — TRIAMTERENE AND HYDROCHLOROTHIAZIDE 37.5; 25 MG/1; MG/1
1 CAPSULE ORAL EVERY MORNING
Qty: 90 CAPSULE | Refills: 1 | Status: SHIPPED | OUTPATIENT
Start: 2021-11-23

## 2021-11-23 RX ORDER — MONTELUKAST SODIUM 10 MG/1
10 TABLET ORAL DAILY
Qty: 90 TABLET | Refills: 0 | Status: SHIPPED | OUTPATIENT
Start: 2021-11-23

## 2021-11-23 RX ORDER — AMLODIPINE AND VALSARTAN 5; 160 MG/1; MG/1
2 TABLET ORAL DAILY
Qty: 180 TABLET | Refills: 0 | Status: SHIPPED | OUTPATIENT
Start: 2021-11-23

## 2021-11-23 RX ORDER — ATORVASTATIN CALCIUM 20 MG/1
20 TABLET, FILM COATED ORAL DAILY
Qty: 90 TABLET | Refills: 0 | Status: SHIPPED | OUTPATIENT
Start: 2021-11-23

## 2021-11-23 RX ORDER — OMEPRAZOLE 20 MG/1
20 CAPSULE, DELAYED RELEASE ORAL DAILY
Qty: 15 CAPSULE | Refills: 0 | Status: SHIPPED | OUTPATIENT
Start: 2021-11-23 | End: 2021-11-30

## 2021-11-23 NOTE — TELEPHONE ENCOUNTER
Medication(s) to Refill:   Requested Prescriptions     Pending Prescriptions Disp Refills   • triamterene-hydroCHLOROthiazide 37.5-25 MG Oral Cap 90 capsule 1     Sig: Take 1 capsule by mouth every morning.    • amLODIPine Besylate-Valsartan 5-160 MG Oral T

## 2021-11-30 NOTE — TELEPHONE ENCOUNTER
Jorge from [address] called and stated Omeprazole cannot be filled as a 15 day supply, only can be given for 90 day supply.  See patient message below    Please re-send for 90 day supply if appropriate

## 2021-12-01 RX ORDER — OMEPRAZOLE 20 MG/1
20 CAPSULE, DELAYED RELEASE ORAL DAILY
Qty: 90 CAPSULE | Refills: 0 | Status: SHIPPED | OUTPATIENT
Start: 2021-12-01

## 2022-02-21 NOTE — TELEPHONE ENCOUNTER
Protocol failed   Amlodipine-valsartan 5-160mg filled 11/23/21 #90 0 refills   Metformin 1000mg filled 11/23/21 #180 0 refills   Montelukast 10mg filled 11/23/21 #90 0 refills   Atorvastatin 20mg filled 11/23/21 #90 0 refills     No Protocol   Omeprazole 20mg filled 12/1/21 #90 0 refills     LOV: 6/7/21   RTC: 4 weeks   Recent Labs: 6/7/21      Upcoming OV: none scheduled

## 2022-02-22 RX ORDER — AMLODIPINE AND VALSARTAN 5; 160 MG/1; MG/1
TABLET ORAL
Qty: 180 TABLET | Refills: 0 | Status: SHIPPED | OUTPATIENT
Start: 2022-02-22

## 2022-02-22 RX ORDER — ATORVASTATIN CALCIUM 20 MG/1
TABLET, FILM COATED ORAL
Qty: 90 TABLET | Refills: 0 | Status: SHIPPED | OUTPATIENT
Start: 2022-02-22

## 2022-02-22 RX ORDER — OMEPRAZOLE 20 MG/1
CAPSULE, DELAYED RELEASE ORAL
Qty: 90 CAPSULE | Refills: 0 | Status: SHIPPED | OUTPATIENT
Start: 2022-02-22

## 2022-02-22 RX ORDER — MONTELUKAST SODIUM 10 MG/1
TABLET ORAL
Qty: 90 TABLET | Refills: 0 | Status: SHIPPED | OUTPATIENT
Start: 2022-02-22

## 2022-03-02 ENCOUNTER — HOSPITAL ENCOUNTER (OUTPATIENT)
Age: 42
Discharge: HOME | End: 2022-03-02
Payer: COMMERCIAL

## 2022-03-02 VITALS
WEIGHT: 315 LBS | HEIGHT: 71 IN | BODY MASS INDEX: 44.1 KG/M2 | SYSTOLIC BLOOD PRESSURE: 136 MMHG | HEART RATE: 106 BPM | TEMPERATURE: 98 F | DIASTOLIC BLOOD PRESSURE: 89 MMHG | OXYGEN SATURATION: 95 % | RESPIRATION RATE: 18 BRPM

## 2022-03-02 DIAGNOSIS — J06.9 VIRAL URI WITH COUGH: Primary | ICD-10-CM

## 2022-03-02 LAB — SARS-COV-2 RNA RESP QL NAA+PROBE: NOT DETECTED

## 2022-03-02 PROCEDURE — 99213 OFFICE O/P EST LOW 20 MIN: CPT

## 2022-03-02 PROCEDURE — 99212 OFFICE O/P EST SF 10 MIN: CPT

## 2022-03-02 RX ORDER — CLOTRIMAZOLE 1 %
CREAM (GRAM) TOPICAL 2 TIMES DAILY
COMMUNITY

## 2022-06-05 DIAGNOSIS — E11.9 TYPE 2 DIABETES MELLITUS WITHOUT COMPLICATION (HCC): ICD-10-CM

## 2022-06-05 DIAGNOSIS — I10 ESSENTIAL HYPERTENSION: Chronic | ICD-10-CM

## 2022-06-06 RX ORDER — MONTELUKAST SODIUM 10 MG/1
TABLET ORAL
Qty: 90 TABLET | Refills: 3 | Status: SHIPPED | OUTPATIENT
Start: 2022-06-06

## 2022-06-06 RX ORDER — OMEPRAZOLE 20 MG/1
CAPSULE, DELAYED RELEASE ORAL
Qty: 90 CAPSULE | Refills: 3 | Status: SHIPPED | OUTPATIENT
Start: 2022-06-06

## 2022-06-06 RX ORDER — TRIAMTERENE AND HYDROCHLOROTHIAZIDE 37.5; 25 MG/1; MG/1
CAPSULE ORAL
Qty: 90 CAPSULE | Refills: 3 | Status: SHIPPED | OUTPATIENT
Start: 2022-06-06

## 2022-06-06 RX ORDER — AMLODIPINE AND VALSARTAN 5; 160 MG/1; MG/1
TABLET ORAL
Qty: 180 TABLET | Refills: 3 | Status: SHIPPED | OUTPATIENT
Start: 2022-06-06

## 2022-06-06 RX ORDER — ATORVASTATIN CALCIUM 20 MG/1
TABLET, FILM COATED ORAL
Qty: 90 TABLET | Refills: 3 | Status: SHIPPED | OUTPATIENT
Start: 2022-06-06

## 2022-06-06 NOTE — TELEPHONE ENCOUNTER
cpx scheduled with patient for 7/21    Protocol failed     Requesting: triamterene-hydrochlorothiazide 37.5-25mg   LOV: 6/7/21   RTC: 4 weeks   Filled:  11/23/21 #90 1 refill   Recent Labs: 8/12/21     Upcoming OV: 7/21

## 2022-07-21 ENCOUNTER — OFFICE VISIT (OUTPATIENT)
Dept: INTERNAL MEDICINE CLINIC | Facility: CLINIC | Age: 42
End: 2022-07-21
Payer: COMMERCIAL

## 2022-07-21 VITALS
HEIGHT: 71 IN | DIASTOLIC BLOOD PRESSURE: 84 MMHG | RESPIRATION RATE: 18 BRPM | HEART RATE: 118 BPM | BODY MASS INDEX: 44.1 KG/M2 | WEIGHT: 315 LBS | OXYGEN SATURATION: 98 % | TEMPERATURE: 98 F | SYSTOLIC BLOOD PRESSURE: 116 MMHG

## 2022-07-21 DIAGNOSIS — E11.9 TYPE 2 DIABETES MELLITUS WITHOUT COMPLICATION, UNSPECIFIED WHETHER LONG TERM INSULIN USE (HCC): ICD-10-CM

## 2022-07-21 DIAGNOSIS — E78.00 PURE HYPERCHOLESTEROLEMIA: ICD-10-CM

## 2022-07-21 DIAGNOSIS — Z00.00 ROUTINE GENERAL MEDICAL EXAMINATION AT A HEALTH CARE FACILITY: Primary | ICD-10-CM

## 2022-07-21 DIAGNOSIS — I10 PRIMARY HYPERTENSION: ICD-10-CM

## 2022-07-21 DIAGNOSIS — G47.33 OSA (OBSTRUCTIVE SLEEP APNEA): ICD-10-CM

## 2022-07-21 DIAGNOSIS — K76.0 FATTY LIVER DISEASE, NONALCOHOLIC: ICD-10-CM

## 2022-07-21 PROBLEM — E66.01 MORBID OBESITY WITH BMI OF 50.0-59.9, ADULT (HCC): Chronic | Status: ACTIVE | Noted: 2021-03-31

## 2022-07-21 PROCEDURE — 3008F BODY MASS INDEX DOCD: CPT | Performed by: INTERNAL MEDICINE

## 2022-07-21 PROCEDURE — 99396 PREV VISIT EST AGE 40-64: CPT | Performed by: INTERNAL MEDICINE

## 2022-07-21 PROCEDURE — 93000 ELECTROCARDIOGRAM COMPLETE: CPT | Performed by: INTERNAL MEDICINE

## 2022-07-21 PROCEDURE — 3079F DIAST BP 80-89 MM HG: CPT | Performed by: INTERNAL MEDICINE

## 2022-07-21 PROCEDURE — 3074F SYST BP LT 130 MM HG: CPT | Performed by: INTERNAL MEDICINE

## 2022-07-21 NOTE — PATIENT INSTRUCTIONS
Further recommendations about weight loss and diabetes will be done once the blood work is available  Will review records of his previous cerebrovascular accident prior to specific recommendations

## 2022-11-30 ENCOUNTER — HOSPITAL ENCOUNTER (OUTPATIENT)
Age: 42
Discharge: HOME | End: 2022-11-30
Attending: EMERGENCY MEDICINE
Payer: COMMERCIAL

## 2022-11-30 VITALS
WEIGHT: 315 LBS | SYSTOLIC BLOOD PRESSURE: 119 MMHG | DIASTOLIC BLOOD PRESSURE: 72 MMHG | TEMPERATURE: 98 F | HEART RATE: 85 BPM | HEIGHT: 71 IN | BODY MASS INDEX: 44.1 KG/M2 | OXYGEN SATURATION: 95 % | RESPIRATION RATE: 20 BRPM

## 2022-11-30 DIAGNOSIS — J11.1 INFLUENZA: Primary | ICD-10-CM

## 2022-11-30 LAB
POCT INFLUENZA A: POSITIVE
POCT INFLUENZA B: NEGATIVE

## 2022-11-30 PROCEDURE — 87502 INFLUENZA DNA AMP PROBE: CPT | Performed by: EMERGENCY MEDICINE

## 2022-11-30 PROCEDURE — 99213 OFFICE O/P EST LOW 20 MIN: CPT

## 2022-11-30 NOTE — DISCHARGE INSTRUCTIONS
Follow-up with your primary care doctor  Drink plenty of fluids for hydration  Tylenol or ibuprofen as needed for fever

## 2022-12-20 ENCOUNTER — HOSPITAL ENCOUNTER (OUTPATIENT)
Age: 42
Discharge: HOME | End: 2022-12-20
Attending: EMERGENCY MEDICINE
Payer: COMMERCIAL

## 2022-12-20 ENCOUNTER — APPOINTMENT (OUTPATIENT)
Dept: GENERAL RADIOLOGY | Age: 42
End: 2022-12-20
Attending: EMERGENCY MEDICINE
Payer: COMMERCIAL

## 2022-12-20 VITALS
TEMPERATURE: 98 F | OXYGEN SATURATION: 98 % | RESPIRATION RATE: 18 BRPM | HEART RATE: 84 BPM | SYSTOLIC BLOOD PRESSURE: 125 MMHG | DIASTOLIC BLOOD PRESSURE: 82 MMHG | BODY MASS INDEX: 52 KG/M2 | WEIGHT: 315 LBS

## 2022-12-20 DIAGNOSIS — J06.9 UPPER RESPIRATORY TRACT INFECTION, UNSPECIFIED TYPE: Primary | ICD-10-CM

## 2022-12-20 LAB — SARS-COV-2 RNA RESP QL NAA+PROBE: NOT DETECTED

## 2022-12-20 PROCEDURE — 71046 X-RAY EXAM CHEST 2 VIEWS: CPT | Performed by: EMERGENCY MEDICINE

## 2022-12-20 PROCEDURE — 99214 OFFICE O/P EST MOD 30 MIN: CPT

## 2022-12-20 RX ORDER — PREDNISONE 20 MG/1
60 TABLET ORAL DAILY
Qty: 12 TABLET | Refills: 0 | Status: SHIPPED | OUTPATIENT
Start: 2022-12-20 | End: 2022-12-24

## 2022-12-20 RX ORDER — BENZONATATE 100 MG/1
100 CAPSULE ORAL 3 TIMES DAILY PRN
Qty: 30 CAPSULE | Refills: 0 | Status: SHIPPED | OUTPATIENT
Start: 2022-12-20 | End: 2023-01-19

## 2022-12-20 RX ORDER — PREDNISONE 20 MG/1
60 TABLET ORAL ONCE
Status: COMPLETED | OUTPATIENT
Start: 2022-12-20 | End: 2022-12-20

## 2022-12-20 RX ORDER — ALBUTEROL SULFATE 90 UG/1
2 AEROSOL, METERED RESPIRATORY (INHALATION) EVERY 4 HOURS PRN
Qty: 1 EACH | Refills: 0 | Status: SHIPPED | OUTPATIENT
Start: 2022-12-20 | End: 2023-01-19

## 2022-12-21 NOTE — ED INITIAL ASSESSMENT (HPI)
Flu last month. Still has a cough and is feeling worse. Has sinus congestion, cough, head ache and dizziness. Denies fevers.

## 2023-01-04 RX ORDER — CLOTRIMAZOLE AND BETAMETHASONE DIPROPIONATE 10; .64 MG/G; MG/G
1 CREAM TOPICAL 2 TIMES DAILY
Qty: 60 G | Refills: 3 | Status: SHIPPED | OUTPATIENT
Start: 2023-01-04 | End: 2023-02-03

## 2023-02-02 RX ORDER — CLOTRIMAZOLE AND BETAMETHASONE DIPROPIONATE 10; .64 MG/G; MG/G
1 CREAM TOPICAL 2 TIMES DAILY
Qty: 60 G | Refills: 3 | Status: SHIPPED | OUTPATIENT
Start: 2023-02-02 | End: 2023-03-04

## 2023-06-14 DIAGNOSIS — I10 ESSENTIAL HYPERTENSION: Chronic | ICD-10-CM

## 2023-06-14 DIAGNOSIS — E11.9 TYPE 2 DIABETES MELLITUS WITHOUT COMPLICATION (HCC): ICD-10-CM

## 2023-06-15 RX ORDER — AMLODIPINE AND VALSARTAN 5; 160 MG/1; MG/1
TABLET ORAL
Qty: 180 TABLET | Refills: 0 | Status: SHIPPED | OUTPATIENT
Start: 2023-06-15

## 2023-06-15 RX ORDER — OMEPRAZOLE 20 MG/1
CAPSULE, DELAYED RELEASE ORAL
Qty: 90 CAPSULE | Refills: 0 | Status: SHIPPED | OUTPATIENT
Start: 2023-06-15

## 2023-06-15 RX ORDER — ATORVASTATIN CALCIUM 20 MG/1
TABLET, FILM COATED ORAL
Qty: 90 TABLET | Refills: 0 | Status: SHIPPED | OUTPATIENT
Start: 2023-06-15

## 2023-06-15 RX ORDER — TRIAMTERENE AND HYDROCHLOROTHIAZIDE 37.5; 25 MG/1; MG/1
CAPSULE ORAL
Qty: 90 CAPSULE | Refills: 0 | Status: SHIPPED | OUTPATIENT
Start: 2023-06-15

## 2023-06-15 RX ORDER — MONTELUKAST SODIUM 10 MG/1
TABLET ORAL
Qty: 90 TABLET | Refills: 0 | Status: SHIPPED | OUTPATIENT
Start: 2023-06-15

## 2023-08-20 DIAGNOSIS — E11.9 TYPE 2 DIABETES MELLITUS WITHOUT COMPLICATION (HCC): ICD-10-CM

## 2023-08-22 NOTE — TELEPHONE ENCOUNTER
Protocol failed     Requesting: metformin 1000mg     LOV: 7/21/22   RTC: 2 weeks   Filled: 6/6/22 # 180 3 refills   Recent Labs: 6/7/21     Upcoming OV: 8/26/23 with RP

## 2023-09-19 DIAGNOSIS — I10 ESSENTIAL HYPERTENSION: Chronic | ICD-10-CM

## 2023-09-21 ENCOUNTER — PATIENT MESSAGE (OUTPATIENT)
Dept: INTERNAL MEDICINE CLINIC | Facility: CLINIC | Age: 43
End: 2023-09-21

## 2023-09-21 RX ORDER — OMEPRAZOLE 20 MG/1
20 CAPSULE, DELAYED RELEASE ORAL DAILY
Qty: 90 CAPSULE | Refills: 0 | OUTPATIENT
Start: 2023-09-21

## 2023-09-21 RX ORDER — AMLODIPINE AND VALSARTAN 5; 160 MG/1; MG/1
2 TABLET ORAL DAILY
Qty: 180 TABLET | Refills: 0 | OUTPATIENT
Start: 2023-09-21

## 2023-09-21 NOTE — TELEPHONE ENCOUNTER
No protocol   Omeprazole 20mg filled: 6/15/23 # 90 0 refills     Protocol failed   Amlodipine-valsartan 5-160mg filled 6/15/23 # 180 0 refills     LOV: 7/21/22  RTC: 2 weeks   Labs: 6/7/21   Future Appointments   Date Time Provider Baljit Mathew   10/10/2023  3:30 PM Stephen Holbrook MD [facility] [place]

## 2023-09-21 NOTE — TELEPHONE ENCOUNTER
I signed the omeprazole and amlodipine that were sent to me previously. Looks like they were sent to [facility]- listed as preferred.

## 2023-09-21 NOTE — TELEPHONE ENCOUNTER
From: Matt Ludwig  To: Robert Lora  Sent: 9/21/2023 3:52 PM CDT  Subject: Prescriptions     Hello,     Is there a reason my prescriptions have not been filled? I submitted the request on Tuesday.      Abhi Chaney

## 2023-09-25 DIAGNOSIS — E11.9 TYPE 2 DIABETES MELLITUS WITHOUT COMPLICATION (HCC): ICD-10-CM

## 2023-09-25 DIAGNOSIS — I10 ESSENTIAL HYPERTENSION: Chronic | ICD-10-CM

## 2023-09-25 RX ORDER — OMEPRAZOLE 20 MG/1
20 CAPSULE, DELAYED RELEASE ORAL DAILY
Qty: 90 CAPSULE | Refills: 3 | Status: SHIPPED | OUTPATIENT
Start: 2023-09-25

## 2023-09-25 NOTE — TELEPHONE ENCOUNTER
Omeprazole 20mg     LOV: 6/7/21   RTC: 4 weeks   Filled: 6/15/23 # 90 0 refills   Labs: 6/7/21   Future Appointments   Date Time Provider Baljit Priyanka   10/10/2023  3:30 PM Giana Cordova MD [facility] [place]

## 2023-09-26 RX ORDER — ATORVASTATIN CALCIUM 20 MG/1
20 TABLET, FILM COATED ORAL DAILY
Qty: 90 TABLET | Refills: 0 | Status: SHIPPED | OUTPATIENT
Start: 2023-09-26

## 2023-09-26 RX ORDER — MONTELUKAST SODIUM 10 MG/1
10 TABLET ORAL DAILY
Qty: 90 TABLET | Refills: 0 | Status: SHIPPED | OUTPATIENT
Start: 2023-09-26

## 2023-09-26 RX ORDER — TRIAMTERENE AND HYDROCHLOROTHIAZIDE 37.5; 25 MG/1; MG/1
1 CAPSULE ORAL EVERY MORNING
Qty: 90 CAPSULE | Refills: 0 | Status: SHIPPED | OUTPATIENT
Start: 2023-09-26

## 2023-09-26 RX ORDER — AMLODIPINE AND VALSARTAN 5; 160 MG/1; MG/1
2 TABLET ORAL DAILY
Qty: 180 TABLET | Refills: 0 | Status: SHIPPED | OUTPATIENT
Start: 2023-09-26

## 2023-09-26 NOTE — TELEPHONE ENCOUNTER
Protocol failed     Amlodipine-valsartan 5-160mg filled: 6/15/23 # 180 0 refills   Montelukast 10mg   Triamterene-hydrochlorothiazide 37.5-25mg   Atorvastatin 20mg     LOV: 7/21/22   RTC: 2 weeks   Filled: 6/15/23 # 90 0 refills   Labs: 6/7/21   Future Appointments   Date Time Provider Baljit Mathew   10/10/2023  3:30 PM Haley Garza MD [facility] [place]

## 2023-11-20 DIAGNOSIS — E11.9 TYPE 2 DIABETES MELLITUS WITHOUT COMPLICATION (HCC): ICD-10-CM

## 2023-11-20 NOTE — TELEPHONE ENCOUNTER
Name from pharmacy: METFORMIN HCL TABS 1000MG         Will file in chart as: METFORMIN HCL 1000 MG Oral Tab    Sig: TAKE 1 TABLET TWICE A DAY    Disp: 180 tablet    Refills: 3    Start: 11/20/2023    Class: Normal    Non-formulary For: Type 2 diabetes mellitus without complication ([facility] [address]    Last ordered: 2 months ago (8/25/2023) by Cristy Alfaro MD    Last refill: 8/28/2023    Rx #: [id number]    Diabetic Medication Protocol [id number]/20/2023 06:35 AM   Protocol Details HgBA1C procedure resulted in past 6 months    Last HgBA1C < 7.5    Microalbumin procedure in past 12 months or taking ACE/ARB    Appointment in past 6 or next 3 months      To be filled at: [address], [address] [place] [address] [phone], [phone]

## 2023-12-25 DIAGNOSIS — I10 ESSENTIAL HYPERTENSION: Chronic | ICD-10-CM

## 2023-12-25 DIAGNOSIS — E11.9 TYPE 2 DIABETES MELLITUS WITHOUT COMPLICATION (HCC): ICD-10-CM

## 2023-12-26 RX ORDER — ATORVASTATIN CALCIUM 20 MG/1
20 TABLET, FILM COATED ORAL DAILY
Qty: 90 TABLET | Refills: 0 | Status: SHIPPED | OUTPATIENT
Start: 2023-12-26

## 2023-12-26 RX ORDER — AMLODIPINE AND VALSARTAN 5; 160 MG/1; MG/1
2 TABLET ORAL DAILY
Qty: 180 TABLET | Refills: 0 | Status: SHIPPED | OUTPATIENT
Start: 2023-12-26

## 2023-12-26 RX ORDER — TRIAMTERENE AND HYDROCHLOROTHIAZIDE 37.5; 25 MG/1; MG/1
1 CAPSULE ORAL EVERY MORNING
Qty: 90 CAPSULE | Refills: 0 | Status: SHIPPED | OUTPATIENT
Start: 2023-12-26

## 2023-12-26 RX ORDER — MONTELUKAST SODIUM 10 MG/1
10 TABLET ORAL DAILY
Qty: 90 TABLET | Refills: 0 | Status: SHIPPED | OUTPATIENT
Start: 2023-12-26

## 2023-12-26 NOTE — TELEPHONE ENCOUNTER
LOV: 7/21/22   RTC: 2 weeks   Labs: 6/7/21   Future Appointments   Date Time Provider Baljit Mirandai   1/8/2024  4:00 PM Haley Garza MD [facility] [place]

## 2024-01-19 ENCOUNTER — PATIENT MESSAGE (OUTPATIENT)
Dept: INTERNAL MEDICINE CLINIC | Facility: CLINIC | Age: 44
End: 2024-01-19

## 2024-01-19 DIAGNOSIS — E11.9 TYPE 2 DIABETES MELLITUS WITHOUT COMPLICATION, UNSPECIFIED WHETHER LONG TERM INSULIN USE (HCC): Primary | ICD-10-CM

## 2024-01-19 DIAGNOSIS — F41.9 ANXIETY: Chronic | ICD-10-CM

## 2024-01-19 DIAGNOSIS — I10 ESSENTIAL HYPERTENSION: ICD-10-CM

## 2024-01-19 DIAGNOSIS — E78.00 PURE HYPERCHOLESTEROLEMIA: ICD-10-CM

## 2024-01-19 DIAGNOSIS — Z00.00 LABORATORY EXAMINATION ORDERED AS PART OF A ROUTINE GENERAL MEDICAL EXAMINATION: ICD-10-CM

## 2024-01-22 NOTE — TELEPHONE ENCOUNTER
From: Leonidas Park  To: Marcos Bermudez  Sent: 1/19/2024 4:00 PM CST  Subject: Lab Work    I have an appointment with Dr. Bermudez on 2/20. Will you please ask him to order lab work and whatever other tests or procedures I need to do so I can take care of these before my appointment?     Thank you,     Farshad Park  [birth year]

## 2024-01-24 ENCOUNTER — LAB ENCOUNTER (OUTPATIENT)
Dept: LAB | Age: 44
End: 2024-01-24
Attending: INTERNAL MEDICINE
Payer: COMMERCIAL

## 2024-01-24 DIAGNOSIS — E11.9 TYPE 2 DIABETES MELLITUS WITHOUT COMPLICATION, UNSPECIFIED WHETHER LONG TERM INSULIN USE (HCC): ICD-10-CM

## 2024-01-24 DIAGNOSIS — Z00.00 LABORATORY EXAMINATION ORDERED AS PART OF A ROUTINE GENERAL MEDICAL EXAMINATION: ICD-10-CM

## 2024-01-24 DIAGNOSIS — I10 ESSENTIAL HYPERTENSION: ICD-10-CM

## 2024-01-24 DIAGNOSIS — E78.00 PURE HYPERCHOLESTEROLEMIA: ICD-10-CM

## 2024-01-24 DIAGNOSIS — F41.9 ANXIETY: ICD-10-CM

## 2024-01-24 LAB
ALBUMIN SERPL-MCNC: 4.1 G/DL (ref 3.4–5)
ALBUMIN/GLOB SERPL: 1.1 {RATIO} (ref 1–2)
ALP LIVER SERPL-CCNC: 84 U/L
ANION GAP SERPL CALC-SCNC: 5 MMOL/L (ref 0–18)
AST SERPL-CCNC: 28 U/L (ref 15–37)
BASOPHILS # BLD AUTO: 0.07 X10(3) UL (ref 0–0.2)
BASOPHILS NFR BLD AUTO: 0.8 %
BILIRUB SERPL-MCNC: 0.5 MG/DL (ref 0.1–2)
BILIRUB UR QL STRIP.AUTO: NEGATIVE
BUN BLD-MCNC: 13 MG/DL (ref 9–23)
CALCIUM BLD-MCNC: 9.1 MG/DL (ref 8.5–10.1)
CHLORIDE SERPL-SCNC: 103 MMOL/L (ref 98–112)
CHOLEST SERPL-MCNC: 119 MG/DL (ref ?–200)
CLARITY UR REFRACT.AUTO: CLEAR
CO2 SERPL-SCNC: 28 MMOL/L (ref 21–32)
CREAT BLD-MCNC: 0.83 MG/DL
CREAT UR-SCNC: 76.7 MG/DL
EGFRCR SERPLBLD CKD-EPI 2021: 110 ML/MIN/1.73M2 (ref 60–?)
EOSINOPHIL # BLD AUTO: 0.1 X10(3) UL (ref 0–0.7)
EOSINOPHIL NFR BLD AUTO: 1.1 %
ERYTHROCYTE [DISTWIDTH] IN BLOOD BY AUTOMATED COUNT: 12.8 %
EST. AVERAGE GLUCOSE BLD GHB EST-MCNC: 278 MG/DL (ref 68–126)
FASTING PATIENT LIPID ANSWER: YES
FASTING STATUS PATIENT QL REPORTED: YES
FOLATE SERPL-MCNC: 21 NG/ML (ref 8.7–?)
GLOBULIN PLAS-MCNC: 3.7 G/DL (ref 2.8–4.4)
GLUCOSE BLD-MCNC: 350 MG/DL (ref 70–99)
GLUCOSE UR STRIP.AUTO-MCNC: >1000 MG/DL
HBA1C MFR BLD: 11.3 % (ref ?–5.7)
HCT VFR BLD AUTO: 46.5 %
HDLC SERPL-MCNC: 36 MG/DL (ref 40–59)
HGB BLD-MCNC: 15.7 G/DL
IMM GRANULOCYTES # BLD AUTO: 0.02 X10(3) UL (ref 0–1)
IMM GRANULOCYTES NFR BLD: 0.2 %
KETONES UR STRIP.AUTO-MCNC: NEGATIVE MG/DL
LDLC SERPL CALC-MCNC: 55 MG/DL (ref ?–100)
LEUKOCYTE ESTERASE UR QL STRIP.AUTO: NEGATIVE
LYMPHOCYTES # BLD AUTO: 3.96 X10(3) UL (ref 1–4)
LYMPHOCYTES NFR BLD AUTO: 43 %
MCH RBC QN AUTO: 30.1 PG (ref 26–34)
MCHC RBC AUTO-ENTMCNC: 33.8 G/DL (ref 31–37)
MCV RBC AUTO: 89.1 FL
MICROALBUMIN UR-MCNC: 0.54 MG/DL
MICROALBUMIN/CREAT 24H UR-RTO: 7 UG/MG (ref ?–30)
MONOCYTES # BLD AUTO: 0.63 X10(3) UL (ref 0.1–1)
MONOCYTES NFR BLD AUTO: 6.8 %
NEUTROPHILS # BLD AUTO: 4.44 X10 (3) UL (ref 1.5–7.7)
NEUTROPHILS # BLD AUTO: 4.44 X10(3) UL (ref 1.5–7.7)
NEUTROPHILS NFR BLD AUTO: 48.1 %
NITRITE UR QL STRIP.AUTO: NEGATIVE
NONHDLC SERPL-MCNC: 83 MG/DL (ref ?–130)
OSMOLALITY SERPL CALC.SUM OF ELEC: 296 MOSM/KG (ref 275–295)
PH UR STRIP.AUTO: 6.5 [PH] (ref 5–8)
PLATELET # BLD AUTO: 239 10(3)UL (ref 150–450)
POTASSIUM SERPL-SCNC: 4 MMOL/L (ref 3.5–5.1)
PROT SERPL-MCNC: 7.8 G/DL (ref 6.4–8.2)
PROT UR STRIP.AUTO-MCNC: NEGATIVE MG/DL
RBC # BLD AUTO: 5.22 X10(6)UL
RBC UR QL AUTO: NEGATIVE
SODIUM SERPL-SCNC: 136 MMOL/L (ref 136–145)
SP GR UR STRIP.AUTO: >1.03 (ref 1–1.03)
T3FREE SERPL-MCNC: 2.88 PG/ML (ref 2.4–4.2)
T4 SERPL-MCNC: 10.5 UG/DL
TRIGL SERPL-MCNC: 163 MG/DL (ref 30–149)
TSI SER-ACNC: 1.29 MIU/ML (ref 0.36–3.74)
UROBILINOGEN UR STRIP.AUTO-MCNC: NORMAL MG/DL
VIT B12 SERPL-MCNC: 1324 PG/ML (ref 193–986)
VIT D+METAB SERPL-MCNC: 31.3 NG/ML (ref 30–100)
VLDLC SERPL CALC-MCNC: 24 MG/DL (ref 0–30)
WBC # BLD AUTO: 9.2 X10(3) UL (ref 4–11)

## 2024-01-24 PROCEDURE — 3061F NEG MICROALBUMINURIA REV: CPT | Performed by: INTERNAL MEDICINE

## 2024-01-24 PROCEDURE — 85025 COMPLETE CBC W/AUTO DIFF WBC: CPT

## 2024-01-24 PROCEDURE — 81003 URINALYSIS AUTO W/O SCOPE: CPT

## 2024-01-24 PROCEDURE — 84481 FREE ASSAY (FT-3): CPT

## 2024-01-24 PROCEDURE — 84443 ASSAY THYROID STIM HORMONE: CPT

## 2024-01-24 PROCEDURE — 82607 VITAMIN B-12: CPT

## 2024-01-24 PROCEDURE — 80053 COMPREHEN METABOLIC PANEL: CPT

## 2024-01-24 PROCEDURE — 82043 UR ALBUMIN QUANTITATIVE: CPT

## 2024-01-24 PROCEDURE — 83036 HEMOGLOBIN GLYCOSYLATED A1C: CPT

## 2024-01-24 PROCEDURE — 80061 LIPID PANEL: CPT

## 2024-01-24 PROCEDURE — 84436 ASSAY OF TOTAL THYROXINE: CPT

## 2024-01-24 PROCEDURE — 82570 ASSAY OF URINE CREATININE: CPT

## 2024-01-24 PROCEDURE — 82746 ASSAY OF FOLIC ACID SERUM: CPT

## 2024-01-24 PROCEDURE — 3046F HEMOGLOBIN A1C LEVEL >9.0%: CPT | Performed by: INTERNAL MEDICINE

## 2024-01-24 PROCEDURE — 82306 VITAMIN D 25 HYDROXY: CPT

## 2024-02-05 ENCOUNTER — PATIENT MESSAGE (OUTPATIENT)
Dept: INTERNAL MEDICINE CLINIC | Facility: CLINIC | Age: 44
End: 2024-02-05

## 2024-02-05 NOTE — TELEPHONE ENCOUNTER
From: Leonidas Park  To: Marcos Bermudez  Sent: 2/5/2024 9:21 AM CST  Subject: Appt.     Zayra Lauren,  Dr. Bermudez has reviewed your recent lab results and wanted to share that labs show your sugar is poorly controlled. He recommends you see him for a follow-up appointment first to address your poorly controlled sugar levels before seeing you for your annual physical.    You have a couple options for doing this. You can contact our office and schedule a follow-up appointment prior to your 2/20 annual visit appointment, or we can change your 2/20 appointment to a follow-up visit and then you can reschedule your annual visit for a later date. Please contact our office and let us know what you would like to do.    Thank you,  Ana Luisa BOYCE    We can make the 20th the follow-up and I’ll reschedule the physical.     Farshad

## 2024-02-20 ENCOUNTER — OFFICE VISIT (OUTPATIENT)
Dept: INTERNAL MEDICINE CLINIC | Facility: CLINIC | Age: 44
End: 2024-02-20
Payer: COMMERCIAL

## 2024-02-20 VITALS
BODY MASS INDEX: 44.1 KG/M2 | WEIGHT: 315 LBS | HEIGHT: 71 IN | HEART RATE: 111 BPM | SYSTOLIC BLOOD PRESSURE: 124 MMHG | OXYGEN SATURATION: 96 % | RESPIRATION RATE: 16 BRPM | TEMPERATURE: 98 F | DIASTOLIC BLOOD PRESSURE: 90 MMHG

## 2024-02-20 DIAGNOSIS — E11.9 TYPE 2 DIABETES MELLITUS WITHOUT COMPLICATION, WITHOUT LONG-TERM CURRENT USE OF INSULIN (HCC): Primary | Chronic | ICD-10-CM

## 2024-02-20 PROCEDURE — 99213 OFFICE O/P EST LOW 20 MIN: CPT | Performed by: INTERNAL MEDICINE

## 2024-02-20 PROCEDURE — 3080F DIAST BP >= 90 MM HG: CPT | Performed by: INTERNAL MEDICINE

## 2024-02-20 PROCEDURE — 3008F BODY MASS INDEX DOCD: CPT | Performed by: INTERNAL MEDICINE

## 2024-02-20 PROCEDURE — 3074F SYST BP LT 130 MM HG: CPT | Performed by: INTERNAL MEDICINE

## 2024-02-20 RX ORDER — CLOTRIMAZOLE AND BETAMETHASONE DIPROPIONATE 10; .64 MG/G; MG/G
1 CREAM TOPICAL 2 TIMES DAILY
COMMUNITY
Start: 2023-11-30

## 2024-02-20 RX ORDER — SEMAGLUTIDE 0.68 MG/ML
0.5 INJECTION, SOLUTION SUBCUTANEOUS WEEKLY
Qty: 9 ML | Refills: 1 | Status: SHIPPED | OUTPATIENT
Start: 2024-02-20 | End: 2024-08-18

## 2024-02-20 RX ORDER — SEMAGLUTIDE 0.68 MG/ML
0.25 INJECTION, SOLUTION SUBCUTANEOUS WEEKLY
COMMUNITY
Start: 2024-01-31 | End: 2024-02-20

## 2024-02-20 NOTE — PATIENT INSTRUCTIONS
Patient now motivated will go back to the diabetic clinic and see me for a complete physical on March 11.  All labs were discussed with the patient.

## 2024-02-20 NOTE — PROGRESS NOTES
Leonidas Park  1980 is a 43 year old male.    Chief Complaint   Patient presents with    Follow - Up       HPI:   DIABETES MELLITUS:   The diet that the patient has been following is the American Diabetes Association diet.   The frequency of the monitoring schedule is occasionally.     The results of the last HbgA1c are noted  The microalbumin has been tested  yes.   The feet are symptomatic  no.   Diabetes education has has been completed. some   Compliance with the medical regimen has been optimal.  Patient was advised to go on Ozempic earlier however the patient did have a little conflict with the clinic and stopped taking his Ozempic for the last 12 to 18 months.   Impediments to compliance with the diet and exercise  regimen has been   less than optimum.  Patient does confess to poor dietary habits as well as exercise.  But seems to be motivated    Current Outpatient Medications   Medication Sig Dispense Refill    clotrimazole-betamethasone 1-0.05 % External Cream Apply 1 Application topically 2 (two) times daily. APPLY TO AFFECTED AREA      OZEMPIC, 0.25 OR 0.5 MG/DOSE, 2 MG/3ML Subcutaneous Solution Pen-injector Inject 0.5 mg into the skin once a week. 9 mL 1    montelukast 10 MG Oral Tab Take 1 tablet (10 mg total) by mouth daily. 90 tablet 0    triamterene-hydroCHLOROthiazide 37.5-25 MG Oral Cap Take 1 capsule by mouth every morning. 90 capsule 0    amLODIPine Besylate-Valsartan 5-160 MG Oral Tab Take 2 tablets by mouth daily. 180 tablet 0    atorvastatin 20 MG Oral Tab Take 1 tablet (20 mg total) by mouth daily. 90 tablet 0    metFORMIN HCl 1000 MG Oral Tab Take 1 tablet (1,000 mg total) by mouth 2 (two) times daily. 180 tablet 3    omeprazole 20 MG Oral Capsule Delayed Release Take 1 capsule (20 mg total) by mouth daily. 90 capsule 3    Albuterol Sulfate HFA (PROAIR HFA) 108 (90 Base) MCG/ACT Inhalation Aero Soln Inhale 2 puffs into the lungs every 6 (six) hours as needed for Wheezing. 3 each 1     busPIRone HCl 15 MG Oral Tab Take 1 tablet (15 mg total) by mouth 2 (two) times daily.      CITALOPRAM HYDROBROMIDE 20 MG Oral Tab TAKE 1 TABLET BY MOUTH DAILY 90 tablet 0    aspirin 325 MG Oral Tab Take 1 tablet (325 mg total) by mouth daily.        Past Medical History:   Diagnosis Date    CPAP (continuous positive airway pressure) dependence     GERD (gastroesophageal reflux disease)     Hay fever     Headache     SIA (obstructive sleep apnea)     Stroke (HCC)     9/2015    Unspecified essential hypertension       Social History:  Social History     Socioeconomic History    Marital status:    Tobacco Use    Smoking status: Former     Passive exposure: Past    Smokeless tobacco: Current     Types: Chew    Tobacco comments:     -chews tobacco daily   Vaping Use    Vaping Use: Every day   Substance and Sexual Activity    Alcohol use: Not Currently     Comment: occ    Drug use: Yes     Types: Cannabis     Comment: occ marijuana   Other Topics Concern    Caffeine Concern Yes     Comment: coffee 24oz per day    Exercise Yes        REVIEW OF SYSTEMS:     General/Constitutional:   Weight loss no.   Ophthalmologic:   Change in vision none. Diminished vision no. Double vision no. Vision loss no. Eye check done   Endocrine:   Excessive sweating no. Excessive thirst no. Excessive urination no.   Cardiovascular:   Chest pain no. Claudication no. Leg edema no. Palpitations no. Shortness of breath no.   Musculoskeletal:   Foot symptoms no.   Neurologic:   Burning pain in feet none. Burning pain in hands none. Loss of sensation in specific body area none.           EXAM:   /90 (BP Location: Right arm, Patient Position: Sitting, Cuff Size: large)   Pulse 111   Temp 98.4 °F (36.9 °C) (Temporal)   Resp 16   Ht 5' 11\" (1.803 m)   Wt (!) 366 lb (166 kg)   SpO2 96%   BMI 51.05 kg/m²   General Examination:   GENERAL APPEARANCE: alert and oriented.   HEENT: unremarkable.   NECK/THYROID: no carotid bruit, no  elevation in jugular venous distention (JVD.   RESPIRATORY: clear to auscultation bilaterally.   CARDIOVASCULAR: normal S1S2, no murmurs, click or rubs.   GASTROINTESTINAL: no hepatosplenomegaly,.   EXTREMITIES: 2+.   SKIN: normal.     DIABETIC FOOT EXAM BILATERAL  INSPECTION normal  CIRCULATION normal  MONOFILAMENT normal           ASSESSMENT AND PLAN:   Leonidas was seen today for follow - up.    Diagnoses and all orders for this visit:    Type 2 diabetes mellitus without complication, without long-term current use of insulin (HCC)  -     OZEMPIC, 0.25 OR 0.5 MG/DOSE, 2 MG/3ML Subcutaneous Solution Pen-injector; Inject 0.5 mg into the skin once a week.  -     EDUCATION-OP REFERRAL INADEQUATE GLYCEMIC CONTROL/CHANGE TMT               There are no Patient Instructions on file for this visit.   The patient indicates understanding of these issues and agrees to the plan.  The patient is asked to No follow-ups on file.  .  Ck sugar # pre and post meals ,compliance with diet/exercise enforce. Weight counseling discussed     Marcos Bermudez MD

## 2024-02-28 ENCOUNTER — HOSPITAL ENCOUNTER (OUTPATIENT)
Age: 44
Discharge: HOME | End: 2024-02-28
Payer: COMMERCIAL

## 2024-02-28 VITALS
WEIGHT: 315 LBS | HEART RATE: 96 BPM | TEMPERATURE: 98 F | DIASTOLIC BLOOD PRESSURE: 76 MMHG | BODY MASS INDEX: 44.1 KG/M2 | HEIGHT: 71 IN | OXYGEN SATURATION: 98 % | SYSTOLIC BLOOD PRESSURE: 114 MMHG | RESPIRATION RATE: 20 BRPM

## 2024-02-28 DIAGNOSIS — J11.1 INFLUENZA: Primary | ICD-10-CM

## 2024-02-28 LAB
POCT INFLUENZA A: NEGATIVE
POCT INFLUENZA B: POSITIVE

## 2024-02-28 PROCEDURE — 87502 INFLUENZA DNA AMP PROBE: CPT | Performed by: NURSE PRACTITIONER

## 2024-02-28 PROCEDURE — 99213 OFFICE O/P EST LOW 20 MIN: CPT

## 2024-02-28 PROCEDURE — 99214 OFFICE O/P EST MOD 30 MIN: CPT

## 2024-02-28 RX ORDER — ONDANSETRON 4 MG/1
4 TABLET, ORALLY DISINTEGRATING ORAL EVERY 8 HOURS PRN
Qty: 10 TABLET | Refills: 0 | Status: SHIPPED | OUTPATIENT
Start: 2024-02-28 | End: 2024-03-06

## 2024-02-28 NOTE — ED INITIAL ASSESSMENT (HPI)
Pt c/o fever, nausea, cough, congestion, sore throat, body aches started 2/24/24. Vomited this morning per pt

## 2024-02-28 NOTE — ED PROVIDER NOTES
Patient Seen in: [facility]      History     Chief Complaint   Patient presents with    Fever    Cough/URI    Body ache and/or chills     Stated Complaint: Fever, Sore Throat, Cough, Diarrhea    Subjective:   HPI  43-year-old male with obstructive sleep apnea he has a CPAP, GERD, hypertension, and stroke presents complaining of fever, body aches, congestion, cough, sore throat, nausea with vomiting last time was last night, and diarrhea that started on February 24.  He has an inhaler at home.  He denies any chest pain or shortness of breath.    Objective:   Past Medical History:   Diagnosis Date    CPAP (continuous positive airway pressure) dependence     GERD (gastroesophageal reflux disease)     Hay fever     Headache     SIA (obstructive sleep apnea)     Stroke (HCC)     9/2015    Unspecified essential hypertension               Past Surgical History:   Procedure Laterality Date    KNEE SURGERY Right 1995    2 surgeries                Social History     Socioeconomic History    Marital status:    Tobacco Use    Smoking status: Former     Passive exposure: Past    Smokeless tobacco: Current     Types: Chew    Tobacco comments:     -chews tobacco daily   Vaping Use    Vaping Use: Every day   Substance and Sexual Activity    Alcohol use: Not Currently     Comment: occ    Drug use: Yes     Types: Cannabis     Comment: occ marijuana   Other Topics Concern    Caffeine Concern Yes     Comment: coffee 24oz per day    Exercise Yes              Review of Systems   All other systems reviewed and are negative.      Positive for stated complaint: Fever, Sore Throat, Cough, Diarrhea  Other systems are as noted in HPI.  Constitutional and vital signs reviewed.      All other systems reviewed and negative except as noted above.    Physical Exam     ED Triage Vitals [02/28/24 1121]   /76   Pulse 96   Resp 20   Temp 98 °F (36.7 °C)   Temp src Temporal   SpO2 98 %   O2 Device None (Room air)        Current:/76   Pulse 96   Temp 98 °F (36.7 °C) (Temporal)   Resp 20   Ht 180.3 cm (5' 11\")   Wt (!) 161 kg   SpO2 98%   BMI 49.51 kg/m²         Physical Exam  Vitals and nursing note reviewed.   Constitutional:       General: He is not in acute distress.     Appearance: He is well-developed. He is not ill-appearing or toxic-appearing.   HENT:      Right Ear: Tympanic membrane, ear canal and external ear normal.      Left Ear: Tympanic membrane, ear canal and external ear normal.      Nose: Congestion present. No rhinorrhea.      Mouth/Throat:      Pharynx: No oropharyngeal exudate or posterior oropharyngeal erythema.   Cardiovascular:      Rate and Rhythm: Normal rate and regular rhythm.      Heart sounds: Normal heart sounds.   Pulmonary:      Effort: Pulmonary effort is normal. No respiratory distress.      Breath sounds: Normal breath sounds. No wheezing.   Abdominal:      Tenderness: There is no abdominal tenderness.   Skin:     General: Skin is warm and dry.   Neurological:      Mental Status: He is alert and oriented to person, place, and time.             ED Course     Labs Reviewed   POCT FLU TEST - Abnormal; Notable for the following components:       Result Value    POCT INFLUENZA B Positive (*)     All other components within normal limits    Narrative:     This assay is a rapid molecular in vitro test utilizing nucleic acid amplification of influenza A and B viral RNA.                      MDM     Medical Decision Making  43-year-old male with obstructive sleep apnea he has a CPAP, GERD, hypertension, and stroke presents complaining of fever, body aches, congestion, cough, sore throat, nausea with vomiting last time was last night, and diarrhea that started on February 24.  He has an inhaler at home.  He denies any chest pain or shortness of breath.    Clinical impression: Influenza    Differential diagnosis: Influenza, COVID, pneumonia    Influenza positive.  Patient is out of the  window for Tamiflu.  Patient will be given Zofran.  He already has albuterol at home.  Symptomatic treatment discussed with Tylenol/Motrin and Mucinex.  Patient is afebrile and nontoxic with normal oxygen on room air.  He does not have significant tachycardia.  I do not feel he has a surgical abdomen.  Work note provided.    Problems Addressed:  Influenza: acute illness or injury        Disposition and Plan     Clinical Impression:  1. Influenza         Disposition:  Discharge  2/28/2024 11:43 am    Follow-up:  No follow-up provider specified.        Medications Prescribed:  Current Discharge Medication List        START taking these medications    Details   ondansetron 4 MG Oral Tablet Dispersible Take 1 tablet (4 mg total) by mouth every 8 (eight) hours as needed.  Qty: 10 tablet, Refills: 0

## 2024-02-28 NOTE — DISCHARGE INSTRUCTIONS
Tylenol and Motrin as needed for fever and pain.  Zofran as needed for nausea and vomiting.  Increase oral fluids.  Mucinex for congestion and cough.

## 2024-03-03 ENCOUNTER — PATIENT MESSAGE (OUTPATIENT)
Dept: INTERNAL MEDICINE CLINIC | Facility: CLINIC | Age: 44
End: 2024-03-03

## 2024-03-03 DIAGNOSIS — E11.9 TYPE 2 DIABETES MELLITUS WITHOUT COMPLICATION (HCC): ICD-10-CM

## 2024-03-03 DIAGNOSIS — I10 ESSENTIAL HYPERTENSION: Chronic | ICD-10-CM

## 2024-03-04 RX ORDER — MONTELUKAST SODIUM 10 MG/1
10 TABLET ORAL DAILY
Qty: 90 TABLET | Refills: 3 | Status: SHIPPED | OUTPATIENT
Start: 2024-03-04

## 2024-03-04 RX ORDER — TRIAMTERENE AND HYDROCHLOROTHIAZIDE 37.5; 25 MG/1; MG/1
1 CAPSULE ORAL EVERY MORNING
Qty: 90 CAPSULE | Refills: 3 | Status: SHIPPED | OUTPATIENT
Start: 2024-03-04

## 2024-03-04 RX ORDER — AMLODIPINE AND VALSARTAN 5; 160 MG/1; MG/1
2 TABLET ORAL DAILY
Qty: 180 TABLET | Refills: 3 | Status: SHIPPED | OUTPATIENT
Start: 2024-03-04

## 2024-03-04 RX ORDER — ATORVASTATIN CALCIUM 20 MG/1
20 TABLET, FILM COATED ORAL DAILY
Qty: 90 TABLET | Refills: 3 | Status: SHIPPED | OUTPATIENT
Start: 2024-03-04

## 2024-03-04 NOTE — TELEPHONE ENCOUNTER
From: Leonidas Park  To: Marcos Bermudez  Sent: 3/3/2024 8:24 PM CST  Subject: Physical Appt.     I accidentally accepted an appointment that was moved up from 3/11 to 3/7. I can’t do this. And is there anything available after 4:45 or on Saturdays? 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     Please let me know.     ThanksFarshad

## 2024-03-04 NOTE — TELEPHONE ENCOUNTER
VM - pt is asking for CPX appt after 4:45 PM, okay to schedule somewhere? Please advise, thank you!    4:45 PM available 3/12 and 4/1

## 2024-03-05 NOTE — TELEPHONE ENCOUNTER
Changed pt's CPX to 3/12/24 at 4:45 PM and sent MCM to pt asking him to let us know if this day does not work for him.     Future Appointments   Date Time Provider Department Center   3/12/2024  4:45 PM Marcos Bermudez MD [facility] [place]

## 2024-03-08 ENCOUNTER — TELEPHONE (OUTPATIENT)
Dept: INTERNAL MEDICINE CLINIC | Facility: CLINIC | Age: 44
End: 2024-03-08

## 2024-03-08 NOTE — TELEPHONE ENCOUNTER
Unable to reach MA. Called [facility] billing dept, but they do not use CPT codes.     [phone] LMTCB or reply to [facility].     [facility] sent.

## 2024-03-12 ENCOUNTER — OFFICE VISIT (OUTPATIENT)
Dept: INTERNAL MEDICINE CLINIC | Facility: CLINIC | Age: 44
End: 2024-03-12
Payer: COMMERCIAL

## 2024-03-12 VITALS
DIASTOLIC BLOOD PRESSURE: 66 MMHG | RESPIRATION RATE: 16 BRPM | HEART RATE: 106 BPM | HEIGHT: 71 IN | TEMPERATURE: 97 F | OXYGEN SATURATION: 95 % | BODY MASS INDEX: 44.1 KG/M2 | SYSTOLIC BLOOD PRESSURE: 107 MMHG | WEIGHT: 315 LBS

## 2024-03-12 DIAGNOSIS — E78.00 PURE HYPERCHOLESTEROLEMIA: Chronic | ICD-10-CM

## 2024-03-12 DIAGNOSIS — Z00.00 ROUTINE GENERAL MEDICAL EXAMINATION AT A HEALTH CARE FACILITY: Primary | ICD-10-CM

## 2024-03-12 DIAGNOSIS — I10 ESSENTIAL HYPERTENSION: Chronic | ICD-10-CM

## 2024-03-12 DIAGNOSIS — E11.9 TYPE 2 DIABETES MELLITUS WITHOUT COMPLICATION, WITHOUT LONG-TERM CURRENT USE OF INSULIN (HCC): Chronic | ICD-10-CM

## 2024-03-12 PROCEDURE — 3074F SYST BP LT 130 MM HG: CPT | Performed by: INTERNAL MEDICINE

## 2024-03-12 PROCEDURE — 99396 PREV VISIT EST AGE 40-64: CPT | Performed by: INTERNAL MEDICINE

## 2024-03-12 PROCEDURE — 3078F DIAST BP <80 MM HG: CPT | Performed by: INTERNAL MEDICINE

## 2024-03-12 PROCEDURE — 93000 ELECTROCARDIOGRAM COMPLETE: CPT | Performed by: INTERNAL MEDICINE

## 2024-03-12 PROCEDURE — 3008F BODY MASS INDEX DOCD: CPT | Performed by: INTERNAL MEDICINE

## 2024-03-12 RX ORDER — CLONAZEPAM 0.5 MG/1
0.5 TABLET ORAL 2 TIMES DAILY PRN
COMMUNITY
Start: 2024-02-28

## 2024-03-12 RX ORDER — AMLODIPINE AND VALSARTAN 5; 160 MG/1; MG/1
1 TABLET ORAL DAILY
COMMUNITY
Start: 2024-03-12

## 2024-03-12 RX ORDER — ONDANSETRON 4 MG/1
4 TABLET, ORALLY DISINTEGRATING ORAL DAILY
COMMUNITY
Start: 2024-03-10

## 2024-03-12 RX ORDER — LAMOTRIGINE 25 MG/1
25 TABLET ORAL DAILY
COMMUNITY
Start: 2024-03-10

## 2024-03-12 NOTE — PROGRESS NOTES
Leonidas Park  1980 is a 43 year old male.    Chief Complaint   Patient presents with    Physical       HPI:   No new cc  Current Outpatient Medications   Medication Sig Dispense Refill    clonazePAM 0.5 MG Oral Tab Take 1 tablet (0.5 mg total) by mouth 2 (two) times daily as needed for Anxiety.      lamoTRIgine 25 MG Oral Tab Take 1 tablet (25 mg total) by mouth daily.      ondansetron 4 MG Oral Tablet Dispersible Take 1 tablet (4 mg total) by mouth daily.      amLODIPine Besylate-Valsartan 5-160 MG Oral Tab Take 1 tablet by mouth daily.      montelukast 10 MG Oral Tab Take 1 tablet (10 mg total) by mouth daily. 90 tablet 3    atorvastatin 20 MG Oral Tab Take 1 tablet (20 mg total) by mouth daily. 90 tablet 3    triamterene-hydroCHLOROthiazide 37.5-25 MG Oral Cap Take 1 capsule by mouth every morning. 90 capsule 3    OZEMPIC, 0.25 OR 0.5 MG/DOSE, 2 MG/3ML Subcutaneous Solution Pen-injector Inject 0.5 mg into the skin once a week. 9 mL 1    metFORMIN HCl 1000 MG Oral Tab Take 1 tablet (1,000 mg total) by mouth 2 (two) times daily. 180 tablet 3    omeprazole 20 MG Oral Capsule Delayed Release Take 1 capsule (20 mg total) by mouth daily. 90 capsule 3    Albuterol Sulfate HFA (PROAIR HFA) 108 (90 Base) MCG/ACT Inhalation Aero Soln Inhale 2 puffs into the lungs every 6 (six) hours as needed for Wheezing. 3 each 1    busPIRone HCl 15 MG Oral Tab Take 1 tablet (15 mg total) by mouth 2 (two) times daily.      CITALOPRAM HYDROBROMIDE 20 MG Oral Tab TAKE 1 TABLET BY MOUTH DAILY 90 tablet 0    aspirin 325 MG Oral Tab Take 1 tablet (325 mg total) by mouth daily.        Past Medical History:   Diagnosis Date    Acute, but ill-defined, cerebrovascular disease     Allergic rhinitis     Anxiety     Asthma (HCC)     Calculus of kidney     CPAP (continuous positive airway pressure) dependence     Depression     Diabetes (HCC)     GERD (gastroesophageal reflux disease)     Hay fever     Headache     Obesity     SIA  (obstructive sleep apnea)     Sleep apnea     Stroke ([facility])     9/2015    Unspecified essential hypertension       Social History:  Social History     Socioeconomic History    Marital status:    Tobacco Use    Smoking status: Passive Smoke Exposure - Never Smoker     Passive exposure: Past    Smokeless tobacco: Current     Types: Chew    Tobacco comments:     -chews tobacco daily   Vaping Use    Vaping Use: Every day   Substance and Sexual Activity    Alcohol use: Not Currently     Comment: occ    Drug use: Yes     Frequency: 7.0 times per week     Types: Cannabis     Comment: occ marijuana   Other Topics Concern    Caffeine Concern No    Stress Concern Yes    Weight Concern Yes    Special Diet Yes     Comment: Started eating better two weeks ago    Exercise No    Seat Belt No        REVIEW OF SYSTEMS:     General/Constitutional:   General able to do usual activities, good exercise tolerance, good general state of health, no fatigue, no fever, no weakness .   HEENT/Neck:   Head no dizziness, no lightheadedness. Eyes , no redness , no drainage. Ears no earaches, no fullness, normal hearing, no tinnitus. Nose and Sinuses no recurrent colds, no stuffiness, no discharge, no hay fever, no nosebleeds, no sinus trouble. Mouth and Pharynx no sore throats, no hoarseness. Neck no lumps, no goiter, no neck stiffness or pain.   Endocrine:   Diabetes-patient did do fairly well initially with Ozempic and after increasing the dose the patient started having some nausea and abdominal discomfort.  The patient stopped taking the Ozempic for a few weeks now.  Will reconsider starting it at a low-dose.  Patient does intend to schedule an appointment with the diabetic clinic pending insurance clearance. Thyroid disorder none.   Respiratory:   Patient denies chest pain, cough, ARIAS (dyspnea on exertion),wheezing. Breathing normal pattern . Chest congestion none.   Cardiovascular:   Patient denies chest pain, rheumatic fever,heart  murmur.hx of elevated cholesterol/hx of hypertension noted. Leg edema none. Orthopnea no. Palpitations none. PND (paroxsymal nocturnal dyspnea) none. Exercise no  Gastrointestinal:   Patient denies abdominal pain, acid reflux, blood in stool, vomiting, nausea, heartburn:  ? Voluntary wt loss no change in appetite noted no change in bowel movement noted. Dysphagia none.   Hematology:   Patient denies abnormal bleeding, easy bruising. Enlarged lymph nodes none.   Men Only:   Patient denies difficulty with erection, penile discharge, testicular pain or swelling, urgency/frequency.   Genitourinary:   Patient denies blood in urine, burning on urination, difficulty urinating, dysuria, urinary frequency , urinary incontinence/no history of kidney disease or genital abnormalities. no Dysuria. Nocturia None.   Musculoskeletal:   Patient denies arthritis , back pain, muscle weakness . Joint pain none. Joint stiffness none.   Peripheral Vascular:   General no varicosities, no claudication.   Dermatologic:   Rash none.   Neurologic:   Patient denies dizziness, fainting, headache, loss of consciousness, memory loss, seizures, paresthesias, weakness. Tingling/numbness none. Trouble with balance none.  Does see Dr. Precious Chacon for follow-up on the cerebrovascular accident  Prn   Psychiatric:   Patient does see a psychiatrist for anxiety,  No depression, hallucinations. Insomnia none/Memory loss none.        Hx  of obstructive sleep apnea last sleep study done over 10 years ago feels he is getting restful sleep       EXAM:   /66 (BP Location: Left arm, Patient Position: Sitting, Cuff Size: adult)   Pulse 106   Temp 96.9 °F (36.1 °C) (Temporal)   Resp 16   Ht 5' 11\" (1.803 m)   Wt (!) 354 lb 9.6 oz (160.8 kg)   SpO2 95%   BMI 49.46 kg/m²   GENERAL:   Build: average .   HEENT:   Ear canals: normal.   EOM: within normal limits.Pupils BEERTL.Sclera and Conjunctiva normal.   Head: normocephalic.   Nasal septum: midline.    Nose: normal pink mucosa, no congestion, no swelling, no bleeding.   Oral cavity: normal, no lesions seen.   Turbinates: normal.   NECK:   Carotid bruit: none.   Cervical lymph nodes: unremarkable.   JVD: none.   Range of Motion: normal.   Thyroid: unremarkable.   HEART:   Clicks: no.   Edema: none visible .   Heart sounds: normal.   Murmurs: none.   Rhythm: regular.   LUNGS:   Auscultation: clear .   Chest Shape: normal .   Percussion: normal.   Rales: no .   Respiratory effort: normal .   Rhonchi: no.   Wheezes: no.   ABDOMEN:   Bowel sounds: normal.   General: normal.   Hernia: absent.   Liver, Spleen: no hepatosplenomegaly (HSM).   Tenderness: absent .   GENITOURINARY - MALE:   External genitals: normal scrotum,testis and penis.   PARRIS: normal .   Penis: unremarkable, no penile discharge, no penile lesions.   Testicles: unremarkable, normal-size, without masses, non tender.   EXTREMITIES:   Clubbing: none.   Cyanosis: absent .   Edema: none.   Pulses: present.   Tremors: no.   Varicose veins: absent .   MUSCULOSKELETAL:   Cervical spines: normal.   L-S spines: normal.   Lower extremity joints: normal.  OA knees upper extremity joints: normal.   NEUROLOGICAL:   Babinski: negative.All other reflexes are normal.   Cerebellar Testing grossly/intact: yes.Cranial nerves are normal.   Gait: normal.   Motor: Power,tone,co-ordination normalInvoluntary movements and wasting none.   Sensory: all sensory modalities normal.   LYMPHATICS:   Cervical: none.   Groin: no adenopathy .   Inguinal: no adenopathy.   Supraclavicular: none.   DERMATOLOGY:   Rash: no.    DIABETIC FOOT EXAM BILATERAL  INSPECTION normal  CIRCULATION normal  MONOFILAMENT normal             ASSESSMENT AND PLAN:   Leonidas was seen today for physical.    Diagnoses and all orders for this visit:    Routine general medical examination at a health care facility    Pure hypercholesterolemia  -     EKG with interpretation and Report -IN OFFICE [31008]    Type 2  diabetes mellitus without complication, without long-term current use of insulin (HCC)  -     Refer to Opthalmology    Essential hypertension      BP meds down regulated.  All labs reviewed discussed with patient.    There are no Patient Instructions on file for this visit.   The patient indicates understanding of these issues and agrees to the plan.  The patient is asked to No follow-ups on file.  .      Marcos Bermudez MD

## 2024-03-12 NOTE — PATIENT INSTRUCTIONS
Patient will set up appointment with the diabetic clinic.  Will check his blood pressure readings and sugar readings and be in touch with me.

## 2024-03-13 LAB
ATRIAL RATE: 99 BPM
P AXIS: 51 DEGREES
P-R INTERVAL: 178 MS
Q-T INTERVAL: 352 MS
QRS DURATION: 100 MS
QTC CALCULATION (BEZET): 451 MS
R AXIS: 55 DEGREES
T AXIS: 37 DEGREES
VENTRICULAR RATE: 99 BPM

## 2024-07-04 NOTE — TELEPHONE ENCOUNTER
Medication(s) to Refill:   Requested Prescriptions     Pending Prescriptions Disp Refills   • TRIAMTERENE-HCTZ 37.5-25 MG Oral Cap [Pharmacy Med Name: TRIAMTERENE 37.5MG/ HCTZ 25MG CAPS] 90 capsule 0     Sig: TAKE 1 CAPSULE BY MOUTH EVERY MORNING GENERIC E Patient : Yovani Roth Age: 60 year old Sex: male   MRN: [id number] Encounter Date: 7/4/2024      History     No chief complaint on file.    Yovani Roth is a 60 year old male who has a past medical history of Alcoholic polyneuropathy, anemia, ascites, CKD, cirrhosis, COPD, esophageal varices, hyperlipidemia, jaundice, liver disease, prostate CA and PUD presenting via emergency medical services for evaluation of     The patient denies any current chest pain, dyspnea, cough, fever, chills, diaphoresis, abdominal pain, nausea, vomiting, diarrhea, constipation, urinary retention, urinary frequency, dysuria, hematuria, back pain, neck pain, extremity pain, leg swelling, headache, lightheadedness, dizziness, numbness, tingling, weakness, or any other complaints.     The patient denies recent travel, sick contacts, exposure to travelers, or known exposure to anyone diagnosed with COVID-19.          Allergies   Allergen Reactions    Lorazepam Other (See Comments)     Paradoxic agitation    Oxybutynin HEADACHES    Oxybutynin Chloride Other (See Comments)     headache       Current Discharge Medication List        Prior to Admission Medications    Details   pantoprazole (PROTONIX) 40 MG tablet Take 1 tablet by mouth daily.  Qty: 30 tablet, Refills: 0      thiamine (VITAMIN B1) 100 MG tablet Take 1 tablet by mouth daily.  Qty: 30 tablet, Refills: 0      HYDROcodone-acetaminophen (NORCO) 5-325 MG per tablet Take 1 tablet by mouth every 8 hours as needed for Pain.  Qty: 12 tablet, Refills: 0      rifAXIMin (XIFAXAN) 550 MG Tab Take 1 tablet by mouth every 12 hours. Indications: Impaired Brain Function due to Liver Disease  Qty: 120 tablet, Refills: 5      umeclidinium-vilanterol (ANORO ELLIPTA) 62.5-25 MCG/ACT inhaler Inhale 1 puff into the lungs daily.  Qty: 60 each, Refills: 0      albuterol-ipratropium (COMBIVENT RESPIMAT) 100-20 MCG/ACT inhaler Inhale 1 puff into the lungs every 4 hours as needed for  Shortness of Breath.  Qty: 4 g, Refills: 12      nadolol (CORGARD) 20 MG tablet Take 2 tablets by mouth daily.  Qty: 30 tablet, Refills: 11      spironolactone (ALDACTONE) 25 MG tablet Take 1 tablet by mouth daily.  Qty: 30 tablet, Refills: 0      torsemide (DEMADEX) 10 MG tablet Take 1 tablet by mouth daily.  Qty: 30 tablet, Refills: 0      gabapentin (NEURONTIN) 100 MG capsule Take 1 capsule by mouth in the morning and 1 capsule at noon and 1 capsule in the evening.  Qty: 90 capsule, Refills: 0      naLOXone (NARCAN) 4 MG/0.1ML nasal liquid Spray the content of 1 device into 1 nostril. Call 911. May repeat with 2nd device in alternate nostril if no response in 2-3 minutes.  Qty: 2 each, Refills: 0      lactulose (CHRONULAC) 10 GM/15ML solution Take 30 mLs by mouth in the morning and 30 mLs at noon and 30 mLs in the evening.  Qty: 90 each, Refills: 11      levETIRAcetam (KepPRA) 500 MG tablet Take 1 tablet by mouth every 12 hours.  Qty: 60 tablet, Refills: 1      FLUoxetine (PROzac) 20 MG capsule Take 20 mg by mouth daily.             Past Medical History:   Diagnosis Date    Alcoholic polyneuropathy  (CMD)     Anemia, chronic disease     Ascites     Ataxia     Chronic kidney disease     Cirrhosis  (CMD)     COPD (chronic obstructive pulmonary disease)  (CMD)     Esophageal varices  (CMD)     Hereditary hemochromatosis (CMD)     Hyperbilirubinemia     Hyperlipidemia     Jaundice     Liver disease     Peripheral neuropathy     Prostate cancer  (CMD)     PUD (peptic ulcer disease)        Past Surgical History:   Procedure Laterality Date    ESOPHAGEAL VARICE LIGATION      Banding    FOREIGN BODY REMOVAL Left     L arm    PARACENTESIS      PROSTATECTOMY      ROTATOR CUFF REPAIR Right        Family History   Family history unknown: Yes       Social History     Tobacco Use    Smoking status: Some Days     Current packs/day: 0.50     Types: Cigarettes     Passive exposure: Current    Smokeless tobacco: Never   Vaping Use     Vaping status: never used   Substance Use Topics    Alcohol use: Not Currently     Comment: last drink nov 2023    Drug use: Never       Review of Systems   Constitutional:  Negative for appetite change, chills, diaphoresis, fatigue and fever.   HENT:  Negative for congestion, drooling, facial swelling, rhinorrhea and sore throat.    Eyes:  Negative for photophobia, discharge and redness.   Respiratory:  Negative for cough, chest tightness and shortness of breath.    Cardiovascular:  Negative for chest pain and palpitations.   Gastrointestinal:  Negative for abdominal pain, constipation, diarrhea, nausea and vomiting.   Genitourinary:  Negative for dysuria, flank pain, frequency, hematuria and urgency.   Neurological:  Negative for dizziness and weakness.       Physical Exam     ED Triage Vitals   ED Triage Vitals Group      Temp       Pulse       Resp       BP       SpO2       EtCO2 mmHg       Height       Weight       Weight Scale Used       BMI (Calculated)       IBW/kg (Calculated)        Physical Exam  Vitals and nursing note reviewed.   Constitutional:       Appearance: Normal appearance. He is not ill-appearing or toxic-appearing.   HENT:      Head: Normocephalic and atraumatic.      Right Ear: Tympanic membrane, ear canal and external ear normal. There is no impacted cerumen.      Left Ear: Tympanic membrane, ear canal and external ear normal. There is no impacted cerumen.      Nose: Nose normal. No congestion or rhinorrhea.      Mouth/Throat:      Lips: Pink.      Mouth: Mucous membranes are moist.      Pharynx: Oropharynx is clear. No oropharyngeal exudate or posterior oropharyngeal erythema.      Neck: Full passive range of motion without pain, normal range of motion and neck supple. No rigidity or tenderness.   Eyes:      General: Lids are normal. Vision grossly intact. No scleral icterus.        Right eye: No discharge.         Left eye: No discharge.      Extraocular Movements: Extraocular movements  intact.      Conjunctiva/sclera: Conjunctivae normal.      Pupils: Pupils are equal, round, and reactive to light.   Neck:      Vascular: No JVD.   Cardiovascular:      Rate and Rhythm: Normal rate and regular rhythm.      Pulses: Normal pulses.      Heart sounds:      No friction rub.   Pulmonary:      Effort: Pulmonary effort is normal. No respiratory distress.      Breath sounds: Normal breath sounds and air entry. No stridor.   Chest:      Chest wall: No tenderness.   Abdominal:      General: Abdomen is flat. Bowel sounds are normal. There is no distension.      Palpations: Abdomen is soft.      Tenderness: There is no abdominal tenderness. There is no right CVA tenderness, left CVA tenderness, guarding or rebound.   Musculoskeletal:         General: No swelling or tenderness. Normal range of motion.      Right lower leg: No edema.      Left lower leg: No edema.   Skin:     General: Skin is warm and dry.      Capillary Refill: Capillary refill takes less than 2 seconds.      Coloration: Skin is not jaundiced or pale.   Neurological:      General: No focal deficit present.      Mental Status: He is alert and oriented to person, place, and time.      GCS: GCS eye subscore is 4. GCS verbal subscore is 5. GCS motor subscore is 6.      Motor: No weakness.         ED Course     Procedures    Lab Results     No results found for this visit on 07/04/24.    EKG Results     EKG Interpretation  Rate: ***  Rhythm: {rhythm:34668}   Abnormality: {ABNORMAL:078870}    EKG tracing interpreted by ED physician    Radiology Results     Imaging Results    None         ED Medication Orders (From admission, onward)      None                 Medical Decision Making      Clinical Impression     No diagnosis found.    Disposition        There is no disposition no dispo time  There is no comment      On 7/4/2024, Juan RAMIREZ, accurately documented the service(s) personally performed and the decisions made by Dr. Faustin.    ***

## 2024-09-23 NOTE — TELEPHONE ENCOUNTER
LOV: 3/12/24   RTC: 3 month  Filled 9/25/23 #90 3 refill   Future Appointments   Date Time Provider Department Center   12/23/2024  1:45 PM Ana Luisa Cornelius APRN [facility]

## 2024-12-16 ENCOUNTER — TELEPHONE (OUTPATIENT)
Age: 44
End: 2024-12-16

## 2024-12-16 DIAGNOSIS — E11.9 TYPE 2 DIABETES MELLITUS WITHOUT COMPLICATION, UNSPECIFIED WHETHER LONG TERM INSULIN USE (HCC): Primary | ICD-10-CM

## 2024-12-16 NOTE — TELEPHONE ENCOUNTER
Called pt for appt coming up    LVM since last week.   Future Appointments   Date Time Provider Department Center   12/23/2024  1:45 PM Ana Luisa Corneilus APRN [facility]     Trying to transfer appt with Ana Luisa cornelius to [facility] as Ana Luisa Cornelius will be virtual that day.   pt needs to establish care for Diabetes center in person   LVMT[facility]     Called pt sister asked if they could send message to have patient contact office

## 2024-12-16 NOTE — TELEPHONE ENCOUNTER
Patient returned call, states he does not want to see anyone other than Ana Luisa AHUJA.  Has not established care yet, patient is okay with telemedicine if LEIGHA agrees. Aware would need annual labs.  Kaylyn checking with Ana Luisa.

## 2024-12-16 NOTE — TELEPHONE ENCOUNTER
Ok for patient to keep appt for 12/23/24 but needs to be switched to vitrual visit, pt needs to get all labs Lipid, Comp, Mirco and A1C done for VV with Ana Luisa Cornelius.   Pt can come in person to [facility] on 1/7/24 at 5:15 pm if they dont want to do virtual visit.     Pt labs need to be done before appt or appt will be canceled.   Pt called back and confirmed he will get labs done before appt on 12/23/24 asked pt if he is checking BG states he is not checking at all. Informed he can by on OTC and start checking 1-2 x daily now before appt. Pt then states he doesn't want to check.

## 2024-12-17 ENCOUNTER — LAB ENCOUNTER (OUTPATIENT)
Dept: LAB | Age: 44
End: 2024-12-17
Attending: NURSE PRACTITIONER
Payer: COMMERCIAL

## 2024-12-17 DIAGNOSIS — E11.9 TYPE 2 DIABETES MELLITUS WITHOUT COMPLICATION, UNSPECIFIED WHETHER LONG TERM INSULIN USE (HCC): ICD-10-CM

## 2024-12-17 LAB
ALBUMIN SERPL-MCNC: 4.7 G/DL (ref 3.2–4.8)
ALBUMIN/GLOB SERPL: 1.4 {RATIO} (ref 1–2)
ALP LIVER SERPL-CCNC: 71 U/L
ALT SERPL-CCNC: 109 U/L
ANION GAP SERPL CALC-SCNC: 12 MMOL/L (ref 0–18)
AST SERPL-CCNC: 58 U/L (ref ?–34)
BILIRUB SERPL-MCNC: 0.6 MG/DL (ref 0.3–1.2)
BUN BLD-MCNC: 15 MG/DL (ref 9–23)
CALCIUM BLD-MCNC: 9.9 MG/DL (ref 8.7–10.4)
CHLORIDE SERPL-SCNC: 101 MMOL/L (ref 98–112)
CHOLEST SERPL-MCNC: 133 MG/DL (ref ?–200)
CO2 SERPL-SCNC: 22 MMOL/L (ref 21–32)
CREAT BLD-MCNC: 0.97 MG/DL
CREAT UR-SCNC: 102.5 MG/DL
EGFRCR SERPLBLD CKD-EPI 2021: 98 ML/MIN/1.73M2 (ref 60–?)
EST. AVERAGE GLUCOSE BLD GHB EST-MCNC: 235 MG/DL (ref 68–126)
FASTING PATIENT LIPID ANSWER: NO
FASTING STATUS PATIENT QL REPORTED: NO
GLOBULIN PLAS-MCNC: 3.4 G/DL (ref 2–3.5)
GLUCOSE BLD-MCNC: 153 MG/DL (ref 70–99)
HBA1C MFR BLD: 9.8 % (ref ?–5.7)
HDLC SERPL-MCNC: 31 MG/DL (ref 40–59)
LDLC SERPL CALC-MCNC: 64 MG/DL (ref ?–100)
MICROALBUMIN UR-MCNC: 0.7 MG/DL
MICROALBUMIN/CREAT 24H UR-RTO: 6.8 UG/MG (ref ?–30)
NONHDLC SERPL-MCNC: 102 MG/DL (ref ?–130)
OSMOLALITY SERPL CALC.SUM OF ELEC: 284 MOSM/KG (ref 275–295)
POTASSIUM SERPL-SCNC: 3.8 MMOL/L (ref 3.5–5.1)
PROT SERPL-MCNC: 8.1 G/DL (ref 5.7–8.2)
SODIUM SERPL-SCNC: 135 MMOL/L (ref 136–145)
TRIGL SERPL-MCNC: 234 MG/DL (ref 30–149)
VLDLC SERPL CALC-MCNC: 35 MG/DL (ref 0–30)

## 2024-12-17 PROCEDURE — 82043 UR ALBUMIN QUANTITATIVE: CPT

## 2024-12-17 PROCEDURE — 80053 COMPREHEN METABOLIC PANEL: CPT

## 2024-12-17 PROCEDURE — 82570 ASSAY OF URINE CREATININE: CPT

## 2024-12-17 PROCEDURE — 83036 HEMOGLOBIN GLYCOSYLATED A1C: CPT

## 2024-12-17 PROCEDURE — 36415 COLL VENOUS BLD VENIPUNCTURE: CPT

## 2024-12-17 PROCEDURE — 80061 LIPID PANEL: CPT

## 2024-12-22 DIAGNOSIS — E11.9 TYPE 2 DIABETES MELLITUS WITHOUT COMPLICATION (HCC): ICD-10-CM

## 2024-12-22 NOTE — PROGRESS NOTES
Telehealth visit: Please note that the following visit was completed using two-way, real-time interactive audio and video communication.  Time Spent:  36 min     Leonidas Park is a 44 year old presenting today to establish for type 2 diabetes management.   Most recent  A1C 9.8 ( last A1C  11.3% )   Was referred by PCP : Marcos Bermudez MD 2-2024       Past 3+ years of poorly controlled Diabetes per A1C on file  Diagnosed Diabetes 2015         Does not appear to be consistently filling ozempic prescription; last fill was 1 m supply 7-  No issues w metformin prescription   In past was seeing [facility] (last appointment 2021)     Earlier this year he was tolerating ozempic 0.25mg ; but when PCP increased to 0.5mg he had increase in nausea/vomiting symptoms. Pt stopped prescription. Recently restarted 3 weeks ago; tolerating well at this time at 0.25mg dose     Also admits a lot of life stressors during that time w the 0.5 mg dose increase. Feels stress results in nausea symptoms.        Family history: denies diabetes hx but strong cardiac disease  Sister with autoimmune disorders (unsure type)      In past heaviest weight: 420 lb   In past seeing weight loss really impacts his BG trends     Feels recent weight loss really not due to eating habits but higher glucose levels     Recent labs; reviewed today     Not testing BG daily- desires personal continuous glucose sensor     BG on  mg/dl     2015: ; very stressful job. Occipital stroke; has some residual decreased vision L eye   No other deficits     2020 ct abd: LIVER:  Fatty infiltration of the liver without focal mass on this study without contrast         Diabetes History:  Diabetes ~ 2015   Patient has not had hospitalizations for blood sugar issues  denies any history of pancreatitis      Previous DM therapies:  N/a     Current DM Regimen:  ozempic 0.25mg subcutaneous once weekly (2 doses )    Metformin 1000mg twice daily       HGBA1C:     Lab Results   Component Value Date    A1C 9.8 (H) 12/17/2024    A1C 11.3 (H) 01/24/2024    A1C 13.1 (H) 08/12/2021     (H) 12/17/2024       Lab Results   Component Value Date    CHOLEST 133 12/17/2024    CHOLEST 119 01/24/2024    TRIG 234 (H) 12/17/2024    TRIG 163 (H) 01/24/2024    HDL 31 (L) 12/17/2024    HDL 36 (L) 01/24/2024    LDL 64 12/17/2024    LDL 55 01/24/2024     Lab Results   Component Value Date    MICROALBCREA 6.8 12/17/2024    MICROALBCREA 7.0 01/24/2024      Lab Results   Component Value Date    CREATSERUM 0.97 12/17/2024    CREATSERUM 0.83 01/24/2024    EGFRCR 98 12/17/2024    EGFRCR 110 01/24/2024     Lab Results   Component Value Date    AST 58 (H) 12/17/2024    AST 28 01/24/2024     (H) 12/17/2024    ALT  01/24/2024      Comment:      Due to  backorder we are temporarily unable to offer hospital-based ALT testing at [facility].   If urgently needed, please order ALT test code [id number].   The new order will need a new venipuncture and will be sent to [facility] for testing.   The expected turnaround time will be within 24 hours.        Lab Results   Component Value Date    TSH 1.290 01/24/2024    TSH 1.070 06/07/2021    T4F 1.1 08/13/2015             DM Complications:  Microvascular:   Neuropathy: yes  Retinopathy: no  Nephropathy: no    Macrovascular:  PVD: no   CAD: no  Stroke/CVA: yes  2015         Modifying factors:  Medication adherence: no - see above   Barriers: no    Recent steroids, illness or infections ( past 3m): no     Allergies: Dander and Seasonal    Past Medical History:    Acute, but ill-defined, cerebrovascular disease    Allergic rhinitis    Anxiety    Asthma (HCC)    Calculus of kidney    CPAP (continuous positive airway pressure) dependence    Depression    Diabetes (HCC)    GERD (gastroesophageal reflux disease)    Hay fever    Headache    Obesity    SIA (obstructive sleep apnea)    Sleep apnea    Stroke (HCC)    9/2015    Unspecified essential  hypertension     Past Surgical History:   Procedure Laterality Date    Knee replacement surgery  1995, 1996    Knee surgery Right 01/01/1995    2 surgeries     Social History     Socioeconomic History    Marital status:    Tobacco Use    Smoking status: Passive Smoke Exposure - Never Smoker     Passive exposure: Past    Smokeless tobacco: Current     Types: Chew    Tobacco comments:     -chews tobacco daily   Vaping Use    Vaping status: Every Day   Substance and Sexual Activity    Alcohol use: Not Currently     Comment: occ    Drug use: Yes     Frequency: 7.0 times per week     Types: Cannabis     Comment: occ marijuana   Other Topics Concern    Caffeine Concern No    Stress Concern Yes    Weight Concern Yes    Special Diet Yes     Comment: Started eating better two weeks ago    Exercise No    Seat Belt No     Family History   Problem Relation Age of Onset    Heart Disease Father     Hypertension Father     Obesity Father     Hypertension Mother     Heart Disease Mother         CAD    Hypertension Sister     Obesity Sister     Hypertension Sister     Obesity Sister      Current Medication List:   Current Outpatient Medications   Medication Sig Dispense Refill    Continuous Glucose Sensor (FREESTYLE CRISS 3 PLUS SENSOR) Does not apply Misc 1 Device every 15 (fifteen) days. 2 each 0    metFORMIN  MG Oral Tablet 24 Hr Take 1 tablet (500 mg total) by mouth daily. 360 tablet 1    OMEPRAZOLE 20 MG Oral Capsule Delayed Release TAKE 1 CAPSULE DAILY 90 capsule 3    clonazePAM 0.5 MG Oral Tab Take 1 tablet (0.5 mg total) by mouth 2 (two) times daily as needed for Anxiety.      lamoTRIgine 25 MG Oral Tab Take 1 tablet (25 mg total) by mouth daily.      ondansetron 4 MG Oral Tablet Dispersible Take 1 tablet (4 mg total) by mouth daily.      amLODIPine Besylate-Valsartan 5-160 MG Oral Tab Take 1 tablet by mouth daily.      montelukast 10 MG Oral Tab Take 1 tablet (10 mg total) by mouth daily. 90 tablet 3     atorvastatin 20 MG Oral Tab Take 1 tablet (20 mg total) by mouth daily. 90 tablet 3    triamterene-hydroCHLOROthiazide 37.5-25 MG Oral Cap Take 1 capsule by mouth every morning. 90 capsule 3    OZEMPIC, 0.25 OR 0.5 MG/DOSE, 2 MG/3ML Subcutaneous Solution Pen-injector Inject 0.5 mg into the skin once a week. 9 mL 1    Albuterol Sulfate HFA (PROAIR HFA) 108 (90 Base) MCG/ACT Inhalation Aero Soln Inhale 2 puffs into the lungs every 6 (six) hours as needed for Wheezing. 3 each 1    busPIRone HCl 15 MG Oral Tab Take 1 tablet (15 mg total) by mouth 2 (two) times daily.      CITALOPRAM HYDROBROMIDE 20 MG Oral Tab TAKE 1 TABLET BY MOUTH DAILY 90 tablet 0    aspirin 325 MG Oral Tab Take 1 tablet (325 mg total) by mouth daily.             DM associated review of  symptoms:   Endocrine: Polyuria, polyphagia, polydipsia: no  Neurological: Paresthesias: no  HEENT: Blurred vision: no  Skin: no rash or wounds  Hematological: Hypoglycemia: no      Review of Systems     LUNGS: denies shortness of breath   CARDIOVASCULAR: denies chest pain  GI: denies abdominal pain, nausea or diarrhea   : denies dysuria      Physical exam:  There were no vitals taken for this visit.  There is no height or weight on file to calculate BMI.    Physical Exam     Constitutional: Normal appearance   Cardiovascular: Not assessed today   Pulmonary/Chest: Effort normal  Neurological: Alert and oriented .   Psychiatric: Normal mood and affect.       Assessment/Plan:    Hypertension  CPM      Fatty liver  Recheck liver labs in 1 m   Continue Ozempic prescription - see dm note   Several trials have demonstrated that GLP-1 agonist medications can reduce aminotransferase levels and improve liver histology in patients with Metabolic dysfunction-associated steatohepatitis (MASH (fatty liver) , suggesting that these agents could serve as an alternative treatment option for these patients    Encourage:   - Weight loss   - improve  high blood sugars  - improve  cholesterol levels         Dyslipidemia:   Last  labs done    Elevated trigs; should improve as glycemic targets improve   Continue atorvastatin prescription     Type 2 diabetes mellitus with hyperglycemia, without long-term current use of insulin ([facility])  A1C: 9.8% ( last A1C 11.3%)   Diabetes control is sub optimal .  Reviewed with patient health impact associated with high glucose trends and the importance of better glucose control to prevent onset /progression of DM complications.     Recommendations:   Pursue Osman 3 + continuous glucose sensor - advised if insurance will not cover, cash price is $80 per month     Increase Ozempic 0.25mg --> 0.5mg once weekly after 4th dose  Change Metformin 1000mg to Metformin XR 500m tab twice daily   Consider SGLT2i prescription but investigate KEVIN since sister w autoimmune and poor glycemic trends     Check T1DM antibodies - to rule out T1DM /KEVIN     Reviewed w patient pathophysiology of diabetes, clinical significance of A1c, adverse effects of suboptimal glucose control, and goals of therapy   Reviewed the A1C test, what the value reflects and the goal for the patient.   Reminded pt on A1C and blood sugar targets (Fasting < 130 and post prandial <180 ) and complications associated with hyperglycemia and uncontrolled DM (on AVS)   Recommended SMBG 2- x daily if not using CGM   Continue with lifestyle modifications since they have positive impact on diabetes/blood sugars/health (portion control, physical activity, weight loss)   Reinforced timing and adherence with medication, self-monitoring of blood glucose and routine follow up    Recommended for  patient to follow up in Diabetes center to review carb goals, food label reading, and offer further support/guidance with exercise planning and weight loss. Referred to [facility] for education /MNT support     The patient is asked to return in 2 weeks w MELISSA and 4 weeks w me,  but recommended to contact DM clinic sooner  if questions or concerns.    The patient indicates understanding of these issues and agrees to the plan.      Orders Placed This Encounter    Islet Cell Cytoplasmic Antibody, IgG     Standing Status:   Future     Standing Expiration Date:   2025    Zinc Transporter 8 (ZnT8) Antibodies     Standing Status:   Future     Standing Expiration Date:   2025     Order Specific Question:   Release to patient     Answer:   Immediate    C-Peptide     Standing Status:   Future     Standing Expiration Date:   2025    EMILIA-65 Autoantibody     Standing Status:   Future     Standing Expiration Date:   2025    Continuous Glucose Sensor (FREESTYLE CRISS 3 PLUS SENSOR) Does not apply Misc     Si Device every 15 (fifteen) days.     Dispense:  2 each     Refill:  0    metFORMIN  MG Oral Tablet 24 Hr     Sig: Take 1 tablet (500 mg total) by mouth daily.     Dispense:  360 tablet     Refill:  1         Diabetes complications & risks surveillance:   A1C/Blood pressure: as reported   Nephropathy screenin  continue ace /arb rx.   LIPID screenin  . atorvastatin rx.   Last dilated eye exam: No data recorded Exam shows retinopathy? No data recorded  Date of last PHQ-2 depression screen: No data recorded  Last diabetic foot exam: No data recorded        Note to patient: The  Cures Act makes medical notes like these available to patients in the interest of transparency. However, be advised this is a medical document. It is intended as peer to peer communication. It is written in medical language and may contain abbreviations or verbiage that are unfamiliar. It may appear blunt or direct. Medical documents are intended to carry relevant information, facts as evident, and the clinical opinion of the practitioner.       This has been done in good dariusz to provide continuity of care in the best interest of the provider-patient relationship, due to the on-going public health  crisis/national emergency and because of restrictions of visitation.  There are limitations of this visit as no or only very limited physical exam could be performed.  Every conscious effort was taken to allow for sufficient and adequate time.  This billing visit was spent on reviewing blood sugar trends, DM related labs, medications and decision making.  Appropriate medical decision-making and tests are ordered as detailed in the plan of care above.      Leonidas SALGADO Vivian understands phone or video evaluation is not a substitute for face-to-face examination or emergency care. Patient advised to go to ER or call 911 for worsening symptoms or acute distress.     LEIGHA Weston    Spent 50  min obtaining patient history, evaluating patient, reviewing chart records before aptp, discussing treatment options, lifestyle modifications and completing documentation -  The risks and benefits of my recommendations, as well as other treatment options were discussed with the patient today. questions were also answered to the best of my knowledge.

## 2024-12-23 ENCOUNTER — PATIENT MESSAGE (OUTPATIENT)
Age: 44
End: 2024-12-23

## 2024-12-23 ENCOUNTER — TELEMEDICINE (OUTPATIENT)
Age: 44
End: 2024-12-23
Payer: COMMERCIAL

## 2024-12-23 DIAGNOSIS — E11.9 TYPE 2 DIABETES MELLITUS WITHOUT COMPLICATION, WITHOUT LONG-TERM CURRENT USE OF INSULIN (HCC): Chronic | ICD-10-CM

## 2024-12-23 DIAGNOSIS — E11.65 TYPE 2 DIABETES MELLITUS WITH HYPERGLYCEMIA, WITHOUT LONG-TERM CURRENT USE OF INSULIN (HCC): Primary | ICD-10-CM

## 2024-12-23 DIAGNOSIS — K76.0 FATTY LIVER DISEASE, NONALCOHOLIC: Chronic | ICD-10-CM

## 2024-12-23 DIAGNOSIS — E66.01 MORBID OBESITY WITH BMI OF 50.0-59.9, ADULT (HCC): Chronic | ICD-10-CM

## 2024-12-23 DIAGNOSIS — I10 PRIMARY HYPERTENSION: Chronic | ICD-10-CM

## 2024-12-23 DIAGNOSIS — I63.9 STROKE WITH CEREBRAL ISCHEMIA (HCC): Chronic | ICD-10-CM

## 2024-12-23 RX ORDER — METFORMIN HYDROCHLORIDE 500 MG/1
500 TABLET, EXTENDED RELEASE ORAL DAILY
Qty: 360 TABLET | Refills: 1 | Status: SHIPPED | OUTPATIENT
Start: 2024-12-23

## 2024-12-23 RX ORDER — HYDROCHLOROTHIAZIDE 12.5 MG/1
1 CAPSULE ORAL
Qty: 2 EACH | Refills: 0 | Status: SHIPPED | OUTPATIENT
Start: 2024-12-23

## 2024-12-23 RX ORDER — SEMAGLUTIDE 0.68 MG/ML
0.5 INJECTION, SOLUTION SUBCUTANEOUS WEEKLY
COMMUNITY
Start: 2024-12-23

## 2024-12-23 NOTE — TELEPHONE ENCOUNTER
Protocol failed     LOV: 3/12/24   RTC: none noted   Filled: 11/20/23 #180 3 refills  Labs: 12/17/24   Future Appointments   Date Time Provider Department Center   12/23/2024  1:45 PM Ana Luisa Cornelius APRN [facility]

## 2024-12-23 NOTE — PATIENT INSTRUCTIONS
A1C 9.8%     For most people the target for A1C is less than 7.0%.   More and more people are living long and healthy lives with diabetes. Let’s work together to make a plan that you can balance  in your daily life.      We may need to add another medication but since you are not testing I am not sure where your spikes are occurring.     We can get you started on a continuous glucose sensor but you should have a meter for back up testing (in case)   Let me know if you cannot locate your meter and I can send one over to pharmacy -     I sent dahlia 3 - prescription over to your local [facility]  Most [employer] games plans will not cover unless patients are on insulin   However, cash price is ~ $40 per sensor (or $80 per month) - see below for more info       Change metformin to extended release:for less potential stomach side effects as we try to increase Ozempic  [facility]: Metformin ER 500mg: take 2 tab twice daily     Once you return from your trip and have the extended release Metformin, increase Ozempic to 0.5mg subcutaneous once weekly       As your glucose levels improve, it should also help improve your cholesterol and liver enzymes.   Most common cause of liver disease is something called fatty liver     Several trials have demonstrated that ozempic  medications can lower the liver enzymes and and improve liver tisisue  in patients with Metabolic dysfunction-associated steatohepatitis (MASH (fatty liver) , suggesting that these agents could serve as an alternative treatment option for these patients    MASH/Fatty liver can  get worse over time and for some,  it leads to serious scarring of the liver, called \"cirrhosis.\"    Fatty liver  is not typically treated directly. But the condition can get better when other medical conditions that often happen with fatty liver get treated. For example, losing weight, and controlling high blood sugar and cholesterol can help improve the fatty liver.     With that  in mind,   -Lose weight,   - improve your  high blood sugars  -lowering your high cholesterol levels     Making these changes has benefits besides helping with fatty liver.  These changes can also reduce your chances of having a heart attack or stroke. That's important because people with fatty liver  are often also at risk for heart disease and stroke.         The A1C test provides us with your average blood sugar for the past 3 months. Keeping an A1C less than 7% helps reduce or delay health problems that are related to diabetes.   The main goal of diabetes treatment is to keep your sugar from going too high to prevent or delay complications from the disease as well as maintain your quality of life.       Osman 3 + sensor  This sensor offers real time glucose readings every minute to your smart phone and can be easily viewed with a quick glance.   Download the Freestyle osman 3  evin ; this is required to start ( or activate) the sensor, receive the blood sugar readings, alerts for highs or lows and review your blood sugar history     Clean the skin with alcohol. Allow the skin time to dry   Open the applicator: unscrew the cap from applicator : ONCE cap is removed: DO NOT put cap back on as it may damage the sensor   Place the sensor over the back of your upper arm     To apply the sensor: press firmly and listen for a CLICK. Once you hear the click, pull back on the sensor applicator; the sensor should be attached to the skin on arm     Scan the sensor in the evin.    Apple phone: the reader is on top of phone  Androids: the reader is bottom of phone      You will see a check toribio and message stating: sensor will be ready in 60 minutes : this is the warm  up period.After 60 minutes, the blood sugar readings will appear in the evin       The Osman continuous glucose sensor can only be worn on the back of your arm and is designed to last 15 days . The evin will notify you when it is time to remove the sensor   Your  cell phone  needs to be within 30 feet of the sensor on your arm in order to detect a low or high blood sugar.       In the event that your Sensor stops working, you must use an alternate method to measure your glucose levels and inform your treatment decisions. (so keep your meter/testing supplies handy)     Remember to always have your next Sensor available before your current one ends so you can keep getting your glucose readings.    If the sensor falls off before  the 14 days, please call Osman customer support to get a replacement. Your insurance will only pay for 2 sensors per month as well as your out of pocket cost for the sensor-        Your sensor is water resistant and can be worn while bathing, showering or swimming.It cannot be in water deeper than 3 feet or  under water for longer than 30 minutes. If it does become wet, please pat it dry.     There are bandages that are approved to wear over the sensor to help with keeping them on for 15 days- These are not covered by insurance and many times quite affordable on amazon       If you have to go for any xrays, MRI or CT scans, the sensor will have to be removed prior to the test.   You can also share your data with our clinic.     From the account settings page, click on : \"MY Practices\" tab: enter the practice ID for [facility]: [id number]           Tips for removing the sensor  Pull up the edge of the adhesive that keeps your sensor attached to your skin. Slowly peel away from your skin in one motion (like pulling a bandaid off)  Sometimes, applying warm soapy water or lotion can help loosen the tape around the sensor.   Note: Any remaining adhesive residue on the skin can be removed with warm soapy water or rubbing alcohol.         There are  differences between the interstitial fluid (where sensor is detecting blood sugar) and capillary blood (finger stick check of blood sugar) that may result in differences in  blood sugar readings between  a sensor and results from a fingerstick test using a blood glucose meter. Differences in glucose readings between interstitial fluid (sensor) and capillary blood (fingerstick) may be observed during times of rapid change in blood sugar, such as after eating, dosing insulin, or exercising. There can be about a 5 minute delay for sensor to \"catch up\" which is why you may see some differences between sensor and finger stick blood sugar levels     Any issues please call our office or  [employer] support: [phone]    If you need more assistance; here is a link : [email or web address]-resources.[email or web address]  the link will take you to freestyle website for  videos to watch       Please call me if you have any issues with medication questions, side effects, dosing questions or problems with your blood sugar trends BEFORE CHANGING     American Diabetes Association: blood sugar targets:     Fasting blood sugar (before breakfast) Target:    (ideally less than 110)  2 hours after eating less than 180 (ideally less than  150 )     Call for blood sugars  greater than  200 more than 2 times in a week     If you are wearing the sensor, please look at your trends/averages    Recommendations:   GMI (estimated A1C ) target under  7%     Time in range (healthy blood sugar targets) : goal is over 70%   less than 70 : goal is less than 4%   Over 180 : goal is less than 20 %   Over 250: goal is  less than 5%         Health Maintenance   1. LABS: It is important to monitor your kidney function (blood and urine protein levels) , liver function tests and cholesterol levels when you have diabetes at least once per year.    2. FOOT exams:  daily foot inspections for foot wounds or skin changes are important for foot care. Any unusual changes should be reported immediately.  3. EYE exams: Checking your eyes for diabetes changes is important and you should have a dilated eye exam or retina done by an eye doctor  EVERY year since these changes occur in the BACK of the eye and not visible by you.   4. Dental exam every 6 months. Periodontal disease is 3 times more likely in patients with diabetes.    Diabetes Center Refill policy :     Allow 2-3 business days for refills  Contact your pharmacy at least 5 days prior to running out of medication and have them send an electronic request or submit request through the “request refill” option in your [employer] account.  Refills are not addressed after hours or on weekends; covering providers  do not authorize routine medications on weekends.  If your prescription is due for a refill, you may be due for a follow up appointment. This may impact the ability for you to get a 90 supply if requested.   To best provide you care, patients receiving routine medications need to be seen at least twice per year however if the A1C is above 8% you will be need to be seen more frequently.   Yearly blood work may also required for many medications to insure safe prescribing. If you are due for labs, you will have 30 days to complete the  requested labs before future refills are authorized.   In the event that your preferred pharmacy does not have the requested medication in stock (e.g. Backordered), it is your responsibility to find another pharmacy that has the requested medication available.  We will gladly send a new prescription to that pharmacy at your request.    Thank you   Ana Luisa  [phone]

## 2024-12-23 NOTE — ASSESSMENT & PLAN NOTE
Reviewed with patient health impact associated with high glucose trends and the importance of better glucose control to prevent onset /progression of DM complications.

## 2024-12-24 NOTE — TELEPHONE ENCOUNTER
Patient wanted to confirm if he had labs sent- asked if he can use [facility] in [facility] on the corner of [address] - confirmed via website that they do have lab services at this location- advised patient can get them done there, let us know if any further questions/concerns.

## 2024-12-26 ENCOUNTER — TELEPHONE (OUTPATIENT)
Age: 44
End: 2024-12-26

## 2024-12-26 NOTE — TELEPHONE ENCOUNTER
Call pharmacy to cancel Metformin 1000 mg script per CB request.    Patient is changing to Extended Release.

## 2024-12-26 NOTE — TELEPHONE ENCOUNTER
Received fax from [facility] for therapeutic duplicate medication for   Metfomorin 1000 mg 1 tab twice daily and Metfomorin  mg 1 tab daily asking to clarify medication.     Reviewed OV notes from patient appt  -  Change Metformin 1000mg to Metformin XR 500m tab twice daily please advice

## 2024-12-30 ENCOUNTER — TELEPHONE (OUTPATIENT)
Age: 44
End: 2024-12-30

## 2024-12-30 NOTE — TELEPHONE ENCOUNTER
Received fax from [employer] that PA was approved for dahlia 3+ sensor from 11/30/24 through 12/30/25. Sent letter to scan

## 2024-12-30 NOTE — TELEPHONE ENCOUNTER
Received fax from [employer] that Osman 3 + sensor is needing a PA    Key  [id number]    Pt is not on insulin, proceeding with PA - per CB's last ov note: if not covered pt was educated on OOP price    Your information has been submitted to [employer]. [employer] is reviewing the PA request and you will receive an electronic response. You may check for the updated outcome later by reopening this request. The standard fax determination will also be sent to you directly.    If you have any questions about your PA submission, contact [employer] at [phone].

## 2025-01-02 ENCOUNTER — TELEPHONE (OUTPATIENT)
Age: 45
End: 2025-01-02

## 2025-01-02 DIAGNOSIS — E11.65 TYPE 2 DIABETES MELLITUS WITH HYPERGLYCEMIA, WITHOUT LONG-TERM CURRENT USE OF INSULIN (HCC): ICD-10-CM

## 2025-01-02 RX ORDER — METFORMIN HYDROCHLORIDE 500 MG/1
TABLET, EXTENDED RELEASE ORAL
Qty: 360 TABLET | Refills: 1 | OUTPATIENT
Start: 2025-01-02

## 2025-01-02 NOTE — TELEPHONE ENCOUNTER
Pharmacy called have another Rx  for metformin in process need to know which is the correct one. Please call [phone]  reference number [id number] when calling .

## 2025-01-02 NOTE — TELEPHONE ENCOUNTER
Call with [facility] to clarify Metformin script.  Per chart note, patient to stop Metformin IR and start Metformin  mg two tablets twice daily.  Updated script as historical, removed Metformin 1000 mg from med list.

## 2025-01-07 ENCOUNTER — DIABETIC EDUCATION (OUTPATIENT)
Age: 45
End: 2025-01-07
Payer: COMMERCIAL

## 2025-01-07 DIAGNOSIS — E11.65 TYPE 2 DIABETES MELLITUS WITH HYPERGLYCEMIA, WITHOUT LONG-TERM CURRENT USE OF INSULIN (HCC): Primary | ICD-10-CM

## 2025-01-07 PROCEDURE — G0108 DIAB MANAGE TRN  PER INDIV: HCPCS | Performed by: DIETITIAN, REGISTERED

## 2025-01-07 NOTE — PATIENT INSTRUCTIONS
It was great meeting with you today!   Osman walton card:  BIN: [id number]  GROUP: [id number]  STATIC ID: [id number]  EXPIRES 12/31/2025  PHARMACY HELP LINE: [phone]  2. Check out this website for ideas on cooking veggies: https://www.[employer]/roasted-broccoli/  3. I would recommend trying youtube exercise videos at home to get in some movement during the winter        Blood Glucose Goals  Less than 130 mg/dl in the morning  Less than 180 mg/dl 2 hours after meals.  (You and your doctor might have discussed more specific goals)    Treating low blood sugars and high blood sugars  Lows: Use the rule of 15. If feeling shaky, sweaty, weak or dizzie, check blood sugars. If below 70,eat or drink 15 grams of fast acting carbohydrates. This includes juice, hard candy like starburst skittles or smarties, honey, rasins or glucose tabs. Wait 15 minutes and check blood sugars again. If low treat again. If over 70 and going to eat within an hour, do nothing. If over 70 and it will be over an hour until you eat again eat something with a protein.  Highs: Less than 250, drink water and move your body. If over 250 for more than 3 hours or experiencing  nausea, vomiting, stomach pain, confusion or stomach pain call our office at [phone] or go to the ER.     Julia Che, RD, , LD, CDCES(she/her/hers)  Diabetes Educator  [facility]  [email or web address]  [phone] phone  [phone] Fax    
no

## 2025-01-07 NOTE — PROGRESS NOTES
Leonidas Park  (5/4/1980) attended Diabetes Education.    Referring Provider: Ana Luisa Cornelius         Date: 1/7/2025  Start time: 4:00pm End time: 5:00pm  _______________________________________________     Mr. Park is a 44 year old male who presents today with type II diabetes.    Reason for visit: wants 3+ sensor,  weight loss under 300- goal 250       Prev diab edu? yes        Assessment:     Wt Readings from Last 6 Encounters:   03/12/24 (!) 354 lb 9.6 oz   02/28/24 (!) 355 lb   02/20/24 (!) 366 lb   12/20/22 (!) 370 lb   11/30/22 (!) 365 lb   07/21/22 (!) 375 lb 3.2 oz            Lab Results   Component Value Date    A1C 9.8 (H) 12/17/2024    A1C 11.3 (H) 01/24/2024    A1C 13.1 (H) 08/12/2021    A1C 12.9 (H) 06/07/2021    A1C 8.6 (H) 06/18/2020        Medical History:   Past Medical History:    Acute, but ill-defined, cerebrovascular disease    Allergic rhinitis    Anxiety    Asthma (HCC)    Calculus of kidney    CPAP (continuous positive airway pressure) dependence    Depression    Diabetes (HCC)    GERD (gastroesophageal reflux disease)    Hay fever    Headache    Obesity    SIA (obstructive sleep apnea)    Sleep apnea    Stroke ([facility])    9/2015    Unspecified essential hypertension         Medications:     Current DM management:     Oral:  metformin xr 1000 mg BID      Injectable:  ozempic 0.5 mg              Injection sites: Legs  Lipodystrophy: No    Taking correctly: Yes    Monitoring Blood Glucose:     Continuous Glucose Monitor: dahlia 3+ ordered but not picked up from pharmacy yet            Healthy Eating & Exercise:     Diet History: Low carb and whole wheat, eating low/no carb, yogurt, smoothie with spinach  Usually eats 3 meals/day and 0 snacks/day.      TYPICAL  FOOD  NOTES   Breakfast  smoothie with spinach almond milk, blueberries, flax seed, protein powder, yogurt whole chobani yogurt, banana    Or egg with cheese            Lunch  tuna with church on keto bread,        Dinner  raul rice with  chicken with cheese crust    Tacos with keto tortillas    hamburger      Struggles with eating vegetables   Snacks cheese sticks, almonds, pb and celery, apple         Beverages  water with kamaljit, tea green, lemon with water  70-80 oz 64 oz          Eating out  Used to do frequently but has lowered this recently              Instructed on basic diet guidelines including aiming for 3 meals/day, balancing each meal with variety of nutrients, using plate method as a model for meals - goal of 1/2 plate non-starchy vegetables, 1/4 plate lean proteins, 1/4 plate carbohydrates and modest portions of fruit, yogurt, milk if desired.    We reviewed impact of carbohydrates, fiber, protein, and fat on glucose levels and trends. We discussed balancing meals and snacks with a combination of carbohydrates, proteins, and fiber to help stabilize glucose levels.     We discussed aiming for consistent meal times/carb amounts can help us determine if medications are correct. Reviewed carbohydrate counting, goals for meals (60-75 grams per meal) and snacks (30 grams per snack). Reviewed that carb counting consistently for a week or so can help regain control over glucose.    Carbohydrate counting evin recommendations provided. Carb \"Cheat Sheet\" provided for quick references.     Physical Activity:  Currently exercising: Yes:    Walkingwalking recently 15 days in last 6 weeks     Sleep Patterns: cpap machine, usually good 8 hours   Stress: is high    Lifestyle & Healthy Coping:     Discussed healthy coping options, resources available, and diabetes distress. Discussed signs of diabetes distress and actions to take at that time. Discussed ways to prevent diabetes distress.       Education:     Diabetes Overview  Reviewed diabetes pathophysiology.  Discussed current A1c result and goals.   Discussed benefits of blood glucose control and blood glucose targets.  Reviewed signs, symptoms, prevention, and treatment of hyperglycemia and  hypoglycemia.    Healthy Eating  Reviewed basic nutrition concepts for diabetes management.  Reviewed food groups and impact on blood glucose levels.  Discussed MyPlate Method of balancing diet.  Reviewed carbohydrate counting and label reading.    Being Active  Benefits and effect of activity on blood glucose discussed.  Reviewed when to call MD and benefits of goal setting.    Monitoring  Reinforced glucose monitoring schedules:   Discussed use of personal CGM and benefits.    Taking Medication  Reviewed medication use, action, timing, and side effects.   Injectables: Site selection, rotation, action, timing reviewed.     Reducing Risk  Reviewed overview of complications including: CV health    Healthy Coping  Family involvement/support encouraged  Depression and diabetes reviewed.      Goals:     Practice healthful eating patterns, emphasizing a variety of nutrient dense foods in appropriate portion sizes.    Monitor carbohydrate intake with the MyPlate method.  Take medications as prescribed.  Increase or continue physical activity of at least 150 mins, over at least 3 days a week or per doctors recommendations.   Achieve and maintain weight loss goals.   Improve glycemic control working towards an A1c of 7.0% or less  Using CGM for detailed blood glucose monitoring and management.  Strive for Time In Range of 70% or greater.       Blood Glucose Goals  Blood sugar goals: less than 130 mg/dl in the morning (fasting) and less than 180 mg/dl 2 hours after meals.  Keep glucose tabs or fast acting carbohydrates with you for treatment of lows; for blood glucose levels less than 70 mg/dl, take 15 grams of fast acting carbohydrates (3-4 glucose tabs, 4 ounces of juice, or as discussed). Retest in 15 min. If not above 70 mg/dl, retreat.    Recommendations:     Patient goals:  Try the Frockadvisoray card if needed   Check out this website for ideas on cooking veggies: [email or web address]/[email or web address]-broccoli/   I would  recommend trying youtube exercise videos at home to get in some movement during the winter    Continue to use the Plate Method as a model for your meals:  1/2 of your plate is non-starchy vegetables, 1/4 of your plate is lean protein, 1/4 of your plate is starchy or carbohydrate foods     Check your blood sugars via CGM      Blood glucose guidelines/goals:     ADA (American Diabetes Association):  A1c:  7% or less  Fasting Blood Glucose (before you eat breakfast):    2 Hours After a Meal:  Less than 180     Your doctor may give more specific goals for you.     Recommend follow up with diabetes educator in one month. Bring your glucose log book with you to your visits.     Follow up:   Future Appointments   Date Time Provider Department Center   2/13/2025  7:30 AM Ana Luisa Cornelius APRN [facility]   2/28/2025  1:30 PM Julia Che RD [facility]   5/6/2025  3:30 PM Ana Luisa Cornelius APRN [facility]         Patient verbalized understanding and has no further questions at this time.    Julia Che RD, , LD, CDCES

## 2025-01-30 ENCOUNTER — TELEPHONE (OUTPATIENT)
Facility: CLINIC | Age: 45
End: 2025-01-30

## 2025-01-30 DIAGNOSIS — E11.65 TYPE 2 DIABETES MELLITUS WITH HYPERGLYCEMIA, WITHOUT LONG-TERM CURRENT USE OF INSULIN (HCC): Primary | ICD-10-CM

## 2025-01-30 NOTE — TELEPHONE ENCOUNTER
Future Appointments   Date Time Provider Department Center   2/13/2025  7:30 AM Ana Luisa Cornelius APRN [facility]   2/28/2025  1:30 PM Julia Che RD [facility]   5/6/2025  3:30 PM Ana Luisa Cornelius APRN [facility]       Sending MCM to patient to get labs done before appt pt still does have active orders that need to be done but no A1C listed or yearly labs     For patient and sent MCM

## 2025-02-10 ENCOUNTER — LAB ENCOUNTER (OUTPATIENT)
Dept: LAB | Age: 45
End: 2025-02-10
Attending: NURSE PRACTITIONER
Payer: COMMERCIAL

## 2025-02-10 ENCOUNTER — PATIENT MESSAGE (OUTPATIENT)
Facility: CLINIC | Age: 45
End: 2025-02-10

## 2025-02-10 DIAGNOSIS — E11.65 TYPE 2 DIABETES MELLITUS WITH HYPERGLYCEMIA, WITHOUT LONG-TERM CURRENT USE OF INSULIN (HCC): ICD-10-CM

## 2025-02-10 LAB
ALBUMIN SERPL-MCNC: 4.6 G/DL (ref 3.2–4.8)
ALBUMIN/GLOB SERPL: 1.8 {RATIO} (ref 1–2)
ALP LIVER SERPL-CCNC: 68 U/L
ALT SERPL-CCNC: 63 U/L
ANION GAP SERPL CALC-SCNC: 10 MMOL/L (ref 0–18)
AST SERPL-CCNC: 33 U/L (ref ?–34)
BILIRUB SERPL-MCNC: 0.4 MG/DL (ref 0.3–1.2)
BUN BLD-MCNC: 17 MG/DL (ref 9–23)
CALCIUM BLD-MCNC: 9.7 MG/DL (ref 8.7–10.6)
CHLORIDE SERPL-SCNC: 103 MMOL/L (ref 98–112)
CHOLEST SERPL-MCNC: 110 MG/DL (ref ?–200)
CO2 SERPL-SCNC: 26 MMOL/L (ref 21–32)
CREAT BLD-MCNC: 0.88 MG/DL
CREAT UR-SCNC: 308.9 MG/DL
EGFRCR SERPLBLD CKD-EPI 2021: 107 ML/MIN/1.73M2 (ref 60–?)
EST. AVERAGE GLUCOSE BLD GHB EST-MCNC: 180 MG/DL (ref 68–126)
FASTING PATIENT LIPID ANSWER: YES
FASTING STATUS PATIENT QL REPORTED: YES
GLOBULIN PLAS-MCNC: 2.6 G/DL (ref 2–3.5)
GLUCOSE BLD-MCNC: 165 MG/DL (ref 70–99)
HBA1C MFR BLD: 7.9 % (ref ?–5.7)
HDLC SERPL-MCNC: 34 MG/DL (ref 40–59)
LDLC SERPL CALC-MCNC: 54 MG/DL (ref ?–100)
MICROALBUMIN UR-MCNC: 3.7 MG/DL
MICROALBUMIN/CREAT 24H UR-RTO: 12 UG/MG (ref ?–30)
NONHDLC SERPL-MCNC: 76 MG/DL (ref ?–130)
OSMOLALITY SERPL CALC.SUM OF ELEC: 293 MOSM/KG (ref 275–295)
POTASSIUM SERPL-SCNC: 3.9 MMOL/L (ref 3.5–5.1)
PROT SERPL-MCNC: 7.2 G/DL (ref 5.7–8.2)
SODIUM SERPL-SCNC: 139 MMOL/L (ref 136–145)
TRIGL SERPL-MCNC: 121 MG/DL (ref 30–149)
TSI SER-ACNC: 1.06 UIU/ML (ref 0.55–4.78)
VLDLC SERPL CALC-MCNC: 18 MG/DL (ref 0–30)

## 2025-02-10 PROCEDURE — 80050 GENERAL HEALTH PANEL: CPT | Performed by: NURSE PRACTITIONER

## 2025-02-10 PROCEDURE — 84681 ASSAY OF C-PEPTIDE: CPT | Performed by: NURSE PRACTITIONER

## 2025-02-10 PROCEDURE — 82043 UR ALBUMIN QUANTITATIVE: CPT | Performed by: NURSE PRACTITIONER

## 2025-02-10 PROCEDURE — 86341 ISLET CELL ANTIBODY: CPT | Performed by: NURSE PRACTITIONER

## 2025-02-10 PROCEDURE — 83036 HEMOGLOBIN GLYCOSYLATED A1C: CPT | Performed by: NURSE PRACTITIONER

## 2025-02-10 PROCEDURE — 80061 LIPID PANEL: CPT | Performed by: NURSE PRACTITIONER

## 2025-02-10 PROCEDURE — 82570 ASSAY OF URINE CREATININE: CPT | Performed by: NURSE PRACTITIONER

## 2025-02-10 NOTE — TELEPHONE ENCOUNTER
Future Appointments   Date Time Provider Department Center   2/13/2025  7:30 AM Ana Luisa Cornelius APRN [facility]   2/28/2025  1:30 PM Julia Che RD [facility]   5/6/2025  3:30 PM Ana Luisa Cornelius APRN [facility]     Last A1c value was 9.8% done 12/17/2024.

## 2025-02-11 LAB — C-PEPTIDE: 5.9 NG/ML

## 2025-02-12 LAB
GAD-65: <5 U/ML
PANCREATIC ISLET CELLS: NEGATIVE

## 2025-02-12 NOTE — PROGRESS NOTES
Telehealth visit: Please note that the following visit was completed using two-way, real-time interactive audio and video communication.  Time Spent:  19 min       Leonidas Park is a 44 year old presenting today t for type 2 diabetes management.   Most recent  A1C 7.9%  ( last A1C 9.8%, 11.3% )   Was referred by PCP : Marcos Bermudez MD   Initial consult with me  --> changed to Metformin XR and increased Ozempic   Having some nausea on day 1 and is reluctant to increase dose  Working on improving diet.     Did meet w CDE   Has not yet started on dahlia (cost) but plans on picking up this weekend  Not testing   Reviewed labs today   Confirmed T2DM (sister w autoimmune disease)         Diabetes History:  Diabetes ~    Patient has not had hospitalizations for blood sugar issues  denies any history of pancreatitis      Previous DM therapies:  N/a     Current DM Regimen:  Ozempic 0.5mg subcutaneous once weekly     Metformin  XR 500m tab  twice daily       HGBA1C:    Lab Results   Component Value Date    A1C 7.9 (H) 02/10/2025    A1C 9.8 (H) 2024    A1C 11.3 (H) 2024     (H) 02/10/2025       Lab Results   Component Value Date    CHOLEST 110 02/10/2025    CHOLEST 133 2024    TRIG 121 02/10/2025    TRIG 234 (H) 2024    HDL 34 (L) 02/10/2025    HDL 31 (L) 2024    LDL 54 02/10/2025    LDL 64 2024     Lab Results   Component Value Date    MICROALBCREA 12.0 02/10/2025    MICROALBCREA 6.8 2024      Lab Results   Component Value Date    CREATSERUM 0.89 02/10/2025    CREATSERUM 0.88 02/10/2025    EGFRCR 107 02/10/2025    EGFRCR 107 02/10/2025     Lab Results   Component Value Date    AST 33 02/10/2025    AST 33 02/10/2025    ALT 62 (H) 02/10/2025    ALT 63 (H) 02/10/2025       Lab Results   Component Value Date    TSH 1.057 02/10/2025    TSH 1.065 02/10/2025    T4F 1.1 2015     Component      Latest Ref Rng 2/10/2025   EMILIA-65      0.0 - 5.0 U/mL <5.0         Component      Latest Ref Rng 2/10/2025   Pancreatic Islet Cells      Neg:<1:1  Negative        Component      Latest Ref Rng 2/10/2025   C-PEPTIDE      1.1 - 4.4 ng/mL 5.9 (H)       2020 ct abd: LIVER:  Fatty infiltration of the liver without focal mass on this study without contrast       DM Complications:  Microvascular:   Neuropathy: yes  Retinopathy: no  Nephropathy: no    Macrovascular:  PVD: no   CAD: no  Stroke/CVA: yes  2015 occipital stroke        Modifying factors:  Medication adherence: yes   Barriers: not testing     Recent steroids, illness or infections ( past 3m): no     Allergies: Dander and Seasonal    Past Medical History:    Acute, but ill-defined, cerebrovascular disease    Allergic rhinitis    Anxiety    Asthma (HCC)    Calculus of kidney    CPAP (continuous positive airway pressure) dependence    Depression    Diabetes (HCC)    GERD (gastroesophageal reflux disease)    Hay fever    Headache    Obesity    SIA (obstructive sleep apnea)    Sleep apnea    Stroke (HCC)    9/2015    Unspecified essential hypertension     Past Surgical History:   Procedure Laterality Date    Knee replacement surgery  1995, 1996    Knee surgery Right 01/01/1995    2 surgeries     Social History     Socioeconomic History    Marital status:    Tobacco Use    Smoking status: Passive Smoke Exposure - Never Smoker     Passive exposure: Past    Smokeless tobacco: Current     Types: Chew    Tobacco comments:     -chews tobacco daily   Vaping Use    Vaping status: Every Day   Substance and Sexual Activity    Alcohol use: Not Currently     Comment: occ    Drug use: Yes     Frequency: 7.0 times per week     Types: Cannabis     Comment: occ marijuana   Other Topics Concern    Caffeine Concern No    Stress Concern Yes    Weight Concern Yes    Special Diet Yes     Comment: Started eating better two weeks ago    Exercise No    Seat Belt No     Family History   Problem Relation Age of Onset    Heart Disease Father      Hypertension Father     Obesity Father     Hypertension Mother     Heart Disease Mother         CAD    Hypertension Sister     Obesity Sister     Hypertension Sister     Obesity Sister      Current Medication List:   Current Outpatient Medications   Medication Sig Dispense Refill    Tirzepatide (MOUNJARO) 5 MG/0.5ML Subcutaneous Solution Auto-injector Inject 5 mg into the skin once a week. 2 mL 1    Glucose Blood (ACCU-CHEK GUIDE TEST) In Vitro Strip       metFORMIN  MG Oral Tablet 24 Hr Two tablets twice daily 360 tablet 1    Continuous Glucose Sensor (FREESTYLE CRISS 3 PLUS SENSOR) Does not apply Misc 1 Device every 15 (fifteen) days. 2 each 0    OMEPRAZOLE 20 MG Oral Capsule Delayed Release TAKE 1 CAPSULE DAILY 90 capsule 3    clonazePAM 0.5 MG Oral Tab Take 1 tablet (0.5 mg total) by mouth 2 (two) times daily as needed for Anxiety.      lamoTRIgine 25 MG Oral Tab Take 1 tablet (25 mg total) by mouth daily.      ondansetron 4 MG Oral Tablet Dispersible Take 1 tablet (4 mg total) by mouth daily.      amLODIPine Besylate-Valsartan 5-160 MG Oral Tab Take 1 tablet by mouth daily.      montelukast 10 MG Oral Tab Take 1 tablet (10 mg total) by mouth daily. 90 tablet 3    atorvastatin 20 MG Oral Tab Take 1 tablet (20 mg total) by mouth daily. 90 tablet 3    triamterene-hydroCHLOROthiazide 37.5-25 MG Oral Cap Take 1 capsule by mouth every morning. 90 capsule 3    Albuterol Sulfate HFA (PROAIR HFA) 108 (90 Base) MCG/ACT Inhalation Aero Soln Inhale 2 puffs into the lungs every 6 (six) hours as needed for Wheezing. 3 each 1    busPIRone HCl 15 MG Oral Tab Take 1 tablet (15 mg total) by mouth 2 (two) times daily.      CITALOPRAM HYDROBROMIDE 20 MG Oral Tab TAKE 1 TABLET BY MOUTH DAILY 90 tablet 0    aspirin 325 MG Oral Tab Take 1 tablet (325 mg total) by mouth daily.             DM associated review of  symptoms:   Endocrine: Polyuria, polyphagia, polydipsia: no  Neurological: Paresthesias: no  HEENT: Blurred vision:  no  Skin: no rash or wounds  Hematological: Hypoglycemia: no      Review of Systems     LUNGS: denies shortness of breath   CARDIOVASCULAR: denies chest pain  GI: denies abdominal pain, . + nausea    : denies dysuria      Physical exam:  There were no vitals taken for this visit.  There is no height or weight on file to calculate BMI.    Physical Exam     Constitutional: Normal appearance   Cardiovascular: Not assessed today   Pulmonary/Chest: Effort normal  Neurological: Alert and oriented .   Psychiatric: Normal mood and affect.       Assessment/Plan:    Hypertension  CPM      Fatty liver  ALT still increased - most likely fatty liver  Follow up with PCP to see if liver imaging indicated     Change ozempic to Mounjaro  - see dm note     Several trials have demonstrated that GLP-1 agonist medications can reduce aminotransferase levels and improve liver histology in patients with Metabolic dysfunction-associated steatohepatitis (MASH (fatty liver) , suggesting that these agents could serve as an alternative treatment option for these patients    continue:   - Weight loss   - improve  high blood sugars  - improve cholesterol levels         Dyslipidemia:   Last  labs 1-2025   Improved trigs   Recheck 1 year   Continue atorvastatin prescription     Type 2 diabetes mellitus with hyperglycemia, without long-term current use of insulin (HCC)  A1C: 7.9% ( last A1C  9.8% 11.3%)   Self reported weight: 345 lb  Diabetes control is improving but still  sub optimal .  Reviewed with patient health impact associated with high glucose trends and the importance of better glucose control to prevent onset /progression of DM complications.     Cost barrier w Osman 3 + continuous glucose sensor - will  this weekend    Encouraged self testing w meter/strips to track trends if not using continuous glucose sensor  --> Offer accucheck guide meter /strips     Stop  Ozempic 0.5mg once weekly ( nausea)    Will start Mounjaro 2.5mg  subcutaneous once weekly   After 4 weeks if no GI issues, increase to 5mg once weekly  ~will continue to dose increase based on glycemic response, tolerance and weight impact     Continue Metformin XR 500m tab twice daily   Consider SGLT2i prescription given his hx ASCVD (stroke) but prefers to wait   Will review osman in 2 weeks to assess glycemic targets w change in GLP to Mounjaro       Reviewed  clinical significance of A1c, adverse effects of suboptimal glucose control, and goals of therapy   Reviewed the A1C test, what the value reflects and the goal for the patient.   Reminded pt on A1C and blood sugar targets (Fasting < 130 and post prandial <180 ) and complications associated with hyperglycemia and uncontrolled DM (on AVS)   Recommended SMBG 2- x daily if not using CGM   Continue with lifestyle modifications since they have positive impact on diabetes/blood sugars/health (portion control, physical activity, weight loss)   Reinforced timing and adherence with medication, self-monitoring of blood glucose and routine follow up    Osman check 1-2 weeks     The patient is asked to return in   ,  but recommended to contact DM clinic sooner if questions or concerns.    The patient indicates understanding of these issues and agrees to the plan.      Orders Placed This Encounter    Tirzepatide (MOUNJARO) 5 MG/0.5ML Subcutaneous Solution Auto-injector     Sig: Inject 5 mg into the skin once a week.     Dispense:  2 mL     Refill:  1     Dx e11.9    Glucose Blood (ACCU-CHEK GUIDE TEST) In Vitro Strip         Diabetes complications & risks surveillance:   A1C/Blood pressure: as reported   Nephropathy screenin   continue ace /arb rx.   LIPID screenin  . atorvastatin rx.   Last dilated eye exam: No data recorded Exam shows retinopathy? No data recorded  Last diabetic foot exam: No data recorded        Note to patient: The  Cures Act makes medical notes like these available to  patients in the interest of transparency. However, be advised this is a medical document. It is intended as peer to peer communication. It is written in medical language and may contain abbreviations or verbiage that are unfamiliar. It may appear blunt or direct. Medical documents are intended to carry relevant information, facts as evident, and the clinical opinion of the practitioner.       This has been done in good dariusz to provide continuity of care in the best interest of the provider-patient relationship, due to the on-going public health crisis/national emergency and because of restrictions of visitation.  There are limitations of this visit as no or only very limited physical exam could be performed.  Every conscious effort was taken to allow for sufficient and adequate time.  This billing visit was spent on reviewing blood sugar trends, DM related labs, medications and decision making.  Appropriate medical decision-making and tests are ordered as detailed in the plan of care above.      Leonidas Park understands phone or video evaluation is not a substitute for face-to-face examination or emergency care. Patient advised to go to ER or call 911 for worsening symptoms or acute distress.     Ana Luisa Cornelius, APRN

## 2025-02-13 ENCOUNTER — TELEMEDICINE (OUTPATIENT)
Facility: CLINIC | Age: 45
End: 2025-02-13
Payer: COMMERCIAL

## 2025-02-13 DIAGNOSIS — E78.5 DYSLIPIDEMIA: ICD-10-CM

## 2025-02-13 DIAGNOSIS — K76.0 FATTY LIVER DISEASE, NONALCOHOLIC: ICD-10-CM

## 2025-02-13 DIAGNOSIS — E11.65 TYPE 2 DIABETES MELLITUS WITH HYPERGLYCEMIA, WITHOUT LONG-TERM CURRENT USE OF INSULIN (HCC): Primary | ICD-10-CM

## 2025-02-13 DIAGNOSIS — I10 PRIMARY HYPERTENSION: ICD-10-CM

## 2025-02-13 DIAGNOSIS — E66.01 MORBID OBESITY WITH BMI OF 50.0-59.9, ADULT (HCC): Chronic | ICD-10-CM

## 2025-02-13 PROCEDURE — 98006 SYNCH AUDIO-VIDEO EST MOD 30: CPT | Performed by: NURSE PRACTITIONER

## 2025-02-13 RX ORDER — BLOOD SUGAR DIAGNOSTIC
STRIP MISCELLANEOUS
COMMUNITY
Start: 2025-02-13

## 2025-02-13 RX ORDER — TIRZEPATIDE 5 MG/.5ML
5 INJECTION, SOLUTION SUBCUTANEOUS WEEKLY
Qty: 2 ML | Refills: 1 | Status: SHIPPED | OUTPATIENT
Start: 2025-02-13

## 2025-02-13 NOTE — PATIENT INSTRUCTIONS
We are here to help you manage your diabetes. Please continue with your primary care physician/provider for your routine health care maintenance   Your A1C: 7.9% ( last A1C 9.8%)   Great job! This will really impact your health keeping your sugars in healthier targets!   Your labs showed you are making insulin but with type 2 diabetes the insulin made by pancreas is not as effective which is why medication helps lower the blood sugars/help increase the insulin in the body work more effectively   Be sure you are not charged for CMP, TSH duplicate on your labs - they were reported separately since your other provider was not on staff.     Start the dahlia 3 ; see below for more info  The A1C:  The A1C test provides us with your average blood sugar for the past 3 months. Keeping an A1C less than 7% helps reduce or delay health problems that are related to diabetes.   The main goal of diabetes treatment is to keep your sugar from going too high to prevent or delay complications from the disease as well as maintain your quality of life.   The target for A1C is less than 7.0%      MEDICATIONS:   Changes today :   stop Ozempic  Start Mounjaro - see below     Continue: metformin XR 500m tab twice daily   Start Mounjaro:     It works in the following ways:     Helps the body release insulin when the blood sugar is high - after eating   Helps the body remove excess sugar from the blood  Stops the liver from making and releasing too much sugar   Lowers your appetite   Slows down how quickly the food leaves the stomach -resulting in you feeling full for longer time periods.       Dose instructions:     Start  Mounjaro 2.5mg once weekly for four weeks  After 4 weeks, increase the Mounjaro to 5mg once weekly , unless you are having any stomach side effects.   Contact me if you do not want to increase the dose up to 5mg dose     After the 2nd dose of 5mg mounjaro , contact me if you want 3 m supply or again, increase the dose.        There are several doses for Mounjaro beyond the 2.5 mg starter doses ( Mounjaro dose options 2.5mg / 5mg /7.5mg /10mg /12.5mg /15mg weekly max, if needed)   The dose can be increased based on your tolerance, side effects and response to medication.     Each of these doses should  be taken for a minimum of four weeks. The dose is increased slowly to allow  time  to be sure you are tolerating the medication.     The dose selected depends on how patients are tolerating it and how the blood sugars are doing as well as weight loss effect.     If you are wanting a dose adjustment, please contact me so that I can  review your trends as there may be other medications need adjusting. This may also require an office visit - either in office visit or video visit through [employer].     One of the most common side effects of this  medications is nausea and GI upset.   Here is some advice to reduce these side effects:  - if you're having nausea and ate a large meal before the nausea, the volume of food may be too much for your stomach to handle, reduce meal size  - practice mindfulness to stop eating once full  - avoid eating when not hungry  - avoid high-fat or spicy food (particularly during the initial dose-escalation period)  - moderate your intake of alcohol and fizzy drinks (particularly in the context of nausea and heartburn)  - If you have constipation, be sure you are drinking enough water and eating enough fiber  - if you notice some nausea symptoms and haven't eaten in a while, make sure you are eating at least three meals per day, can try snacking on high protein snack item like yogurt, string cheese, cottage cheese, protein shake  - If you have made these changes and you still notice nausea, please contact the clinic    How to use mounjaro: https://www.mounjaro.com/how-to-use-mounjaro         Osman 3 sensor  This sensor offers real time glucose readings every minute to your smart phone and can be easily viewed  with a quick glance.         Download the Freestyle osman 3 evin ; this is required to start ( or activate) the sensor, receive the blood sugar readings, alerts for highs or lows and review your blood sugar history   From the account settings page, click on : \"MY Practices\" tab: enter the practice ID for [facility]: [id number]  **Please note: we do not get alerts for highs or low sugar readings if occurring. Please contact us with any blood sugar concerns by calling the office.       Clean the skin with alcohol. Allow the skin time to dry   Open the applicator: unscrew the cap from applicator : ONCE cap is removed: DO NOT put cap back on as it may damage the sensor   Place the sensor over the back of your upper arm     To apply the sensor: press firmly and listen for a CLICK. Once you hear the click, pull back on the sensor applicator; the sensor should be attached to the skin on arm     Scan the sensor in the evin.    Apple phone: the reader is on top of phone  Androids: the reader is bottom of phone      You will see a check toribio and message stating: sensor will be ready in 60 minutes : this is the warm  up period.After 60 minutes, the blood sugar readings will appear in the evin       The Osman continuous glucose sensor can only be worn on the back of your arm and is designed to last 15 days . The evin will notify you when it is time to remove the sensor     In the event that your Sensor stops working, you must use an alternate method to measure your glucose levels and inform your treatment decisions. (so keep your meter/testing supplies handy)     Remember to always have your next Sensor available before your current one ends so you can keep getting your glucose readings.    If the sensor falls off before  the 15 days, please call [employer] customer support to get a replacement. Your insurance will only pay for 2 sensors per month as well as your out of pocket cost for the sensor-        Your sensor is water  resistant and can be worn while bathing, showering or swimming.It cannot be in water deeper than 3 feet or  under water for longer than 30 minutes. If it does become wet, please pat it dry.     There are bandages that are approved to wear over the sensor to help with keeping them on for 14 days- These are not covered by insurance and many times quite affordable on amazon       If you have to go for any xrays, MRI or CT scans, the sensor will have to be removed prior to the test.     Tips for removing the sensor  Pull up the edge of the adhesive that keeps your sensor attached to your skin. Slowly peel away from your skin in one motion (like pulling a bandaid off)  Sometimes, applying warm soapy water or lotion can help loosen the tape around the sensor.   Note: Any remaining adhesive residue on the skin can be removed with warm soapy water or rubbing alcohol.         There are  differences between the interstitial fluid (where sensor is detecting blood sugar) and capillary blood (finger stick check of blood sugar) that may result in differences in  blood sugar readings between a sensor and results from a fingerstick test using a blood glucose meter. Differences in glucose readings between interstitial fluid (sensor) and capillary blood (fingerstick) may be observed during times of rapid change in blood sugar, such as after eating, dosing insulin, or exercising. There can be about a 5 minute delay for sensor to \"catch up\" which is why you may see some differences between sensor and finger stick blood sugar levels     Any issues please call our office or  [employer] support: [phone]    If you need more assistance; here is a link : https://www.[email or web address].abbott/us-en/freestyle-dahlia-3-resources.html  the link will take you to freestyle website for  videos to watch         Blood sugar targets:  Before breakfast:   (preferably less than 110)  2 hours After meals: less than 180 (preferably less than 150)   Please  call me if you are having blood sugars   more than 200 more than 2 days in a week time span.   It is important to take all of your medications as prescribed. Please call me if you cannot get the prescriptions filled or are having issues with refills.   Please call me if you have any issues with medication questions, side effects, dosing questions or problems with your blood sugar trends BEFORE CHANGING OR STOPPING ANY MEDICATIONS.   Diabetes health monitoring      1. LABS: It is important to monitor your kidney function (blood and urine protein levels) , liver function tests and cholesterol levels when you have diabetes. If your primary care provider has not ordered these tests, I will order them for you.   2. FOOT exams:  daily foot inspections for foot wounds or skin changes are important for foot care. Any unusual changes should be reported immediately.  3. EYE exams: Checking your eyes for diabetes changes is important and you should have a dilated eye exam done by an eye doctor EVERY year since these changes occur in the BACK of the eye and not visible by you.       Refill policies:     Refills may be delayed if you have not had a recent office visit or recent labs as requested by your prescriber.       Our goal is to process your refill within 3  business day of receiving the refill request.   Contact your pharmacy at least 5 days prior to running out of medication and have them send an electronic request or submit a refill through My chart: select  “request refill” option in your [facility] account.  Refills are not addressed after hours or on weekends; covering providers  do not authorize routine medications on weekends.  Refills  received after 4pm on Fridays will be addressed on Mondays.    If  your prescription is due for a refill, and you are due for a follow up appointment., this will  may impact the ability for you to get a 90 supply if requested.   Yearly blood tests are  required for many medications to  insure safe prescribing. If you are due for labs, you will have 30 days to complete the  requested labs before future refills are authorized.   In the event that your preferred pharmacy does not have the requested medication in stock (e.g. Backordered), it is your responsibility to find another pharmacy that has the requested medication available.  We will gladly send a new prescription to that pharmacy at your request.  To best provide you care, patients receiving routine medications need to be seen at least twice per year. However if the A1C is above 8% you will be need to be seen more frequently as recommended by provider and this will also impact your ability get obtain refills       For telehealth appointments, labs may be required before this appointment,  as requested by me.   If labs are not completed, this may impact the ability to keep  the scheduled telehealth appointment.     Office protocols:   My Chart Messages - Our goal is to respond to all My Chart messages within 2 business days of receipt. Messages received after 4 pm on Friday, will be addressed on Monday, except if holiday.   Please DO NOT send urgent messages through My Chart as these messages are not monitored after hours.       Patient Calls -We make every attempt to answer all patient calls within 1 business day. Calls that come in after 4pm daily will be addressed the following business day. Nurses are available to answer your calls Monday - Friday from 8am - 4 pm except for holidays.          Thank you   Ana Luisa Cornelius   [phone]

## 2025-02-14 ENCOUNTER — NURSE ONLY (OUTPATIENT)
Facility: CLINIC | Age: 45
End: 2025-02-14
Payer: COMMERCIAL

## 2025-02-14 DIAGNOSIS — E11.65 TYPE 2 DIABETES MELLITUS WITH HYPERGLYCEMIA, WITHOUT LONG-TERM CURRENT USE OF INSULIN (HCC): Primary | ICD-10-CM

## 2025-02-14 RX ORDER — TIRZEPATIDE 2.5 MG/.5ML
2.5 INJECTION, SOLUTION SUBCUTANEOUS
Qty: 2 ML | Refills: 0 | COMMUNITY
Start: 2025-02-14

## 2025-02-22 LAB — ZNT8 ABS: <15 U/ML

## 2025-03-13 ENCOUNTER — TELEMEDICINE (OUTPATIENT)
Facility: CLINIC | Age: 45
End: 2025-03-13
Payer: COMMERCIAL

## 2025-03-13 VITALS — BODY MASS INDEX: 46 KG/M2 | WEIGHT: 315 LBS

## 2025-03-13 DIAGNOSIS — E11.65 TYPE 2 DIABETES MELLITUS WITH HYPERGLYCEMIA, WITHOUT LONG-TERM CURRENT USE OF INSULIN (HCC): Primary | ICD-10-CM

## 2025-03-13 NOTE — PATIENT INSTRUCTIONS
It was great meeting with you today! I am so grateful we were able to meet and talk about your diabetes!  I love the goal to start increasing steps to [id number]  I would recommend having an afternoon protein shake or smoothie to increase daily fiber  Great job with your A1c and weight loss!    Blood Glucose Goals  Between  mg/dl in the morning  Less than 180 mg/dl 2 hours after meals.  (You and your doctor might have discussed more specific goals)    Treating low blood sugars and high blood sugars  Always check your blood sugar if you feel any symptoms of a low blood sugar.      If you have a low blood sugar (less than 70 mg/dL) follow the \"Rule of 15\" to treat it:  1.) Take 15 grams of a quick acting carbohydrate (1 tablespoon of honey, 3-4 glucose tablets, 1/2 cup fruit juice, 1/2 can regular soda, or to-go pouch of applesauce). Only eat ONE 15g SERVING of a quick acting carbohydrate.  2.) Then check your blood sugar again after 15 minutes of drinking or eating the quick acting carbohydrate to be sure your blood sugar is above 70 mg/dL.   3.) If your blood sugar is still less than 70 mg/dL, repeat treatment of 15 grams of a quick acting carbohydrate (1 tablespoon of honey, 3-4 glucose tablets, 1/2 cup fruit juice, 1/2 can regular soda, or to-go pouch  of applesauce).  4.) If your next meal is more than one hour away, eat a small snack. Try to have some protein and complex carbohydrate (peanut butter crackers, pretzels and cheese, etc).   5.) If you are not sure what caused your low blood sugar, call your healthcare provider.  6.) Always check your blood sugar before you drive.     _______________________________________________       To treat a low, we recommend you carry with you easy, pre-portioned treatment for low blood sugars that are 15G of carbs:   - Children sized squeeze pouch applesauce (high fiber + carbs help prevent too high of a spike)  - Small children's sized juicebox- 15g carb --> 4oz juice  box  - Glucose tablets from [employer], you can find them near diabetes supplies --> Note, you will need to eat 3-4 tablets to get to 15g of carbs  - Children sized fruit snack pack- look for one with 15 grams of total carbohydrate     AVOID complex carbs, or foods that contain fats along with carbs (like chocolate) can slow the absorption of glucose and should NOT be used to treat an emergency low.    You are doing amazing! Keep up the great work!    Julai Che, RD, , LD, CDCES(she/her/hers)  Diabetes Educator  [facility]  [email or web address]  [phone] phone  [phone] Fax

## 2025-03-13 NOTE — PROGRESS NOTES
Leonidas Park  (5/4/1980) attended Diabetes Education.    Referring Provider: LEIGHA Ruiz       Date: 1/7/2025  Start time: 11:45pm End time: 12:15pm  _______________________________________________     Mr. Park is a 44 year old male who presents today with type II diabetes.    This visit is conducted using Telemedicine with live, interactive audio and video.  Patient verbally consents to Telemedicine visit.  Patient understands and accepts financial responsibility for any deductible, co-insurance and/or co-pays associated with this service.    Reason for visit: wants 3+ sensor,  weight loss under 300- goal 250       Prev diab edu? yes        Assessment:     Wt Readings from Last 6 Encounters:   03/13/25 (!) 332 lb   03/12/24 (!) 354 lb 9.6 oz   02/28/24 (!) 355 lb   02/20/24 (!) 366 lb   12/20/22 (!) 370 lb   11/30/22 (!) 365 lb            Lab Results   Component Value Date    A1C 7.9 (H) 02/10/2025    A1C 9.8 (H) 12/17/2024    A1C 11.3 (H) 01/24/2024    A1C 13.1 (H) 08/12/2021    A1C 12.9 (H) 06/07/2021        Medical History:   Past Medical History:    Acute, but ill-defined, cerebrovascular disease    Allergic rhinitis    Anxiety    Asthma (HCC)    Calculus of kidney    CPAP (continuous positive airway pressure) dependence    Depression    Diabetes (HCC)    GERD (gastroesophageal reflux disease)    Hay fever    Headache    Obesity    SIA (obstructive sleep apnea)    Sleep apnea    Stroke ([facility])    9/2015    Unspecified essential hypertension         Medications:     Current DM management:     Oral:  metformin xr 1000 mg BID      Injectable:  0.25 mounjaro (2 doses- SE: constipation?)               Injection sites: Legs  Lipodystrophy: No    Taking correctly: Yes    Monitoring Blood Glucose:     Continuous Glucose Monitor: dahlia 3+ ordered but not picked up from pharmacy yet  Checked blood sugar this morning and it was 128. Did not check before or since then.           Healthy Eating & Exercise:     Diet  History: Low carb and whole wheat, eating low/no carb, yogurt, smoothie with spinach  Usually eats 3 meals/day and 0 snacks/day.      TYPICAL  FOOD  NOTES   Breakfast  smoothie with spinach almond milk, blueberries, flax seed, protein powder, yogurt whole chobani yogurt, banana    Or egg with cheese         600 kcal, 29 fat, 41 protein, 70 carb (40 sugar), 11 fiber   Lunch  tuna with church on keto bread,        Dinner  raul rice with chicken with cheese crust    Tacos with keto tortillas    hamburger    Low carb pasta with fiber raos pasta sauce with chicken     Corn tortillas with carne asada   Struggles with eating vegetables   Snacks cheese sticks, almonds, pb and celery, apple         Beverages  water with kamaljit, tea green, lemon with water  70-80 oz 64 oz          Eating out  Used to do frequently but has lowered this recently     Less than used to            Instructed on basic diet guidelines including aiming for 3 meals/day, balancing each meal with variety of nutrients, using plate method as a model for meals - goal of 1/2 plate non-starchy vegetables, 1/4 plate lean proteins, 1/4 plate carbohydrates and modest portions of fruit, yogurt, milk if desired.    We reviewed impact of carbohydrates, fiber, protein, and fat on glucose levels and trends. We discussed balancing meals and snacks with a combination of carbohydrates, proteins, and fiber to help stabilize glucose levels.     We discussed aiming for consistent meal times/carb amounts can help us determine if medications are correct. Reviewed carbohydrate counting, goals for meals (60-75 grams per meal) and snacks (30 grams per snack). Reviewed that carb counting consistently for a week or so can help regain control over glucose.    Carbohydrate counting evin recommendations provided. Carb \"Cheat Sheet\" provided for quick references.     Physical Activity:  Currently exercising: Yes:    Walkingwalking recently 15 days in last 6 weeks (6-8000 steps at track  practice)    Sleep Patterns: cpap machine, usually good 8 hours   Stress: is high    Lifestyle & Healthy Coping:     Discussed healthy coping options, resources available, and diabetes distress. Discussed signs of diabetes distress and actions to take at that time. Discussed ways to prevent diabetes distress.       Education:     Diabetes Overview  Reviewed diabetes pathophysiology.  Discussed current A1c result and goals.   Discussed benefits of blood glucose control and blood glucose targets.  Reviewed signs, symptoms, prevention, and treatment of hyperglycemia and hypoglycemia.    Healthy Eating  Reviewed basic nutrition concepts for diabetes management.  Reviewed food groups and impact on blood glucose levels.  Discussed MyPlate Method of balancing diet.  Reviewed carbohydrate counting and label reading.    Being Active  Benefits and effect of activity on blood glucose discussed.  Reviewed when to call MD and benefits of goal setting.    Monitoring  Reinforced glucose monitoring schedules:   Discussed use of personal CGM and benefits.    Taking Medication  Reviewed medication use, action, timing, and side effects.   Injectables: Site selection, rotation, action, timing reviewed.     Reducing Risk  Reviewed overview of complications including: CV health    Healthy Coping  Family involvement/support encouraged  Depression and diabetes reviewed.      Goals:     Practice healthful eating patterns, emphasizing a variety of nutrient dense foods in appropriate portion sizes.    Monitor carbohydrate intake with the MyPlate method.  Take medications as prescribed.  Increase or continue physical activity of at least 150 mins, over at least 3 days a week or per doctors recommendations.   Achieve and maintain weight loss goals.   Improve glycemic control working towards an A1c of 7.0% or less  Using CGM for detailed blood glucose monitoring and management.  Strive for Time In Range of 70% or greater.       Blood Glucose  Goals  Blood sugar goals: less than 130 mg/dl in the morning (fasting) and less than 180 mg/dl 2 hours after meals.  Keep glucose tabs or fast acting carbohydrates with you for treatment of lows; for blood glucose levels less than 70 mg/dl, take 15 grams of fast acting carbohydrates (3-4 glucose tabs, 4 ounces of juice, or as discussed). Retest in 15 min. If not above 70 mg/dl, retreat.    Recommendations:     Patient goals:  I love the goal to start increasing steps to [id number]  I would recommend having an afternoon protein shake or smoothie to increase daily fiber  Great job with your A1c and weight loss!    Continue to use the Plate Method as a model for your meals:  1/2 of your plate is non-starchy vegetables, 1/4 of your plate is lean protein, 1/4 of your plate is starchy or carbohydrate foods     Check your blood sugars via CGM      Blood glucose guidelines/goals:     ADA (American Diabetes Association):  A1c:  7% or less  Fasting Blood Glucose (before you eat breakfast):    2 Hours After a Meal:  Less than 180     Your doctor may give more specific goals for you.     Recommend follow up with diabetes educator in three months. Bring your glucose log book with you to your visits.     Follow up:   Future Appointments   Date Time Provider Department Center   5/6/2025  3:30 PM Ana Luisa Cornelius APRN [facility]   6/5/2025 12:30 PM Julia Che RD [facility]           Patient verbalized understanding and has no further questions at this time.    Julia Che RD, , LD, CDCES

## 2025-03-25 DIAGNOSIS — E11.9 TYPE 2 DIABETES MELLITUS WITHOUT COMPLICATION (HCC): ICD-10-CM

## 2025-03-25 DIAGNOSIS — I10 ESSENTIAL HYPERTENSION: Chronic | ICD-10-CM

## 2025-03-26 NOTE — TELEPHONE ENCOUNTER
[facility] sent to patient to clarify dosage of amLODIPine Besylate-Valsartan   Is patient taking once or twice daily?       Protocol failed     LOV: 3/12/24   RTC: 3 months  Triamterene-hydrochlorothiazide 37.5-25mg, montelukast 10mg, atorvastatin 20mg filled 3/4/24 #90 3 refills   Labs: 2/10/25   Future Appointments   Date Time Provider Department Center   5/6/2025  3:30 PM Ana Luisa Cornelius APRN [facility]   6/5/2025 12:30 PM Julia Che, SHAMAR [facility]

## 2025-03-28 RX ORDER — AMLODIPINE AND VALSARTAN 5; 160 MG/1; MG/1
2 TABLET ORAL DAILY
Qty: 180 TABLET | Refills: 0 | Status: SHIPPED | OUTPATIENT
Start: 2025-03-28

## 2025-03-28 RX ORDER — ATORVASTATIN CALCIUM 20 MG/1
20 TABLET, FILM COATED ORAL DAILY
Qty: 90 TABLET | Refills: 0 | Status: SHIPPED | OUTPATIENT
Start: 2025-03-28

## 2025-03-28 RX ORDER — MONTELUKAST SODIUM 10 MG/1
10 TABLET ORAL DAILY
Qty: 90 TABLET | Refills: 0 | Status: SHIPPED | OUTPATIENT
Start: 2025-03-28

## 2025-03-28 RX ORDER — TRIAMTERENE AND HYDROCHLOROTHIAZIDE 37.5; 25 MG/1; MG/1
1 CAPSULE ORAL EVERY MORNING
Qty: 90 CAPSULE | Refills: 0 | Status: SHIPPED | OUTPATIENT
Start: 2025-03-28

## 2025-05-07 ENCOUNTER — TELEPHONE (OUTPATIENT)
Facility: CLINIC | Age: 45
End: 2025-05-07

## 2025-05-07 DIAGNOSIS — E11.65 TYPE 2 DIABETES MELLITUS WITH HYPERGLYCEMIA, WITHOUT LONG-TERM CURRENT USE OF INSULIN (HCC): Primary | ICD-10-CM

## 2025-05-12 ENCOUNTER — TELEPHONE (OUTPATIENT)
Facility: CLINIC | Age: 45
End: 2025-05-12

## 2025-05-12 NOTE — TELEPHONE ENCOUNTER
Left message for patient to call office.  Please confirm dose Mounjaro 2.5 mg once weekly.  If tolerating, agreeable to increase to Mounjaro 5 mg injection once weekly?

## 2025-05-12 NOTE — TELEPHONE ENCOUNTER
Future Appointments   Date Time Provider Department Center   5/22/2025 11:45 AM Ana Luisa Cornelius APRN [facility]   6/5/2025 12:30 PM Julia Che RD [facility]     Pended A1C for upcoming appt   Last A1c value was 7.9% done 2/10/2025.

## 2025-05-12 NOTE — TELEPHONE ENCOUNTER
Patient needs refill Of MOUNJARO has 1 week left   Also needs lab orders in for A1C has a virtual visit scheduled for May 22

## 2025-05-16 ENCOUNTER — LAB ENCOUNTER (OUTPATIENT)
Dept: LAB | Age: 45
End: 2025-05-16
Attending: NURSE PRACTITIONER
Payer: COMMERCIAL

## 2025-05-16 DIAGNOSIS — E11.65 TYPE 2 DIABETES MELLITUS WITH HYPERGLYCEMIA, WITHOUT LONG-TERM CURRENT USE OF INSULIN (HCC): ICD-10-CM

## 2025-05-16 LAB
EST. AVERAGE GLUCOSE BLD GHB EST-MCNC: 128 MG/DL (ref 68–126)
HBA1C MFR BLD: 6.1 % (ref ?–5.7)

## 2025-05-16 PROCEDURE — 36415 COLL VENOUS BLD VENIPUNCTURE: CPT

## 2025-05-16 PROCEDURE — 83036 HEMOGLOBIN GLYCOSYLATED A1C: CPT

## 2025-05-22 ENCOUNTER — TELEMEDICINE (OUTPATIENT)
Facility: CLINIC | Age: 45
End: 2025-05-22
Payer: COMMERCIAL

## 2025-05-22 DIAGNOSIS — E11.69 DYSLIPIDEMIA ASSOCIATED WITH TYPE 2 DIABETES MELLITUS (HCC): ICD-10-CM

## 2025-05-22 DIAGNOSIS — E78.5 DYSLIPIDEMIA ASSOCIATED WITH TYPE 2 DIABETES MELLITUS (HCC): ICD-10-CM

## 2025-05-22 DIAGNOSIS — E11.65 TYPE 2 DIABETES MELLITUS WITH HYPERGLYCEMIA, WITHOUT LONG-TERM CURRENT USE OF INSULIN (HCC): ICD-10-CM

## 2025-05-22 DIAGNOSIS — E66.01 MORBID OBESITY WITH BMI OF 50.0-59.9, ADULT (HCC): Primary | Chronic | ICD-10-CM

## 2025-05-22 DIAGNOSIS — I10 PRIMARY HYPERTENSION: Chronic | ICD-10-CM

## 2025-05-22 RX ORDER — TIRZEPATIDE 7.5 MG/.5ML
7.5 INJECTION, SOLUTION SUBCUTANEOUS WEEKLY
Qty: 2 ML | Refills: 1 | Status: SHIPPED | OUTPATIENT
Start: 2025-05-22

## 2025-05-22 RX ORDER — METFORMIN HYDROCHLORIDE 500 MG/1
TABLET, EXTENDED RELEASE ORAL
Qty: 90 TABLET | Refills: 0 | Status: SHIPPED | OUTPATIENT
Start: 2025-05-22

## 2025-05-22 NOTE — PATIENT INSTRUCTIONS
A1C  6.1% ( improved from 7.9%)     Please have your diabetic eye exam. This exam is critical to preventing and treating eye conditions caused by diabetes that could potentially lead to vision loss      Increase Mounjaro to 7.5mg subcutaneous once weekly     We can continue to increase dose every 4 weeks up to max dose Mounjaro 15mg once weekly     Decrease Metformin XR 500mg  to 1 tab daily     Also as you continue to lose weight, we may be able to decrease your Blood pressure medications         Osman 3 sensor  You can connect the sensor to our diabetes center if you want to share your trends -     From the account settings page, click on : \"MY Practices\" tab: enter the practice ID for [facility]: [id number]     We do not get alerts for lows or highs- so please contact me with any Blood sugar concerns            American Diabetes Association: blood sugar targets:     Fasting blood sugar (before breakfast) Target:    (ideally less than 110)  2 hours after eating less than 180 (ideally less than  150 )     Call for blood sugars  greater than 180 more than 2 times in a week     If you are wearing the sensor, please look at your trends/averages    Recommendations:   GMI (estimated A1C ) target under  7%     Time in range (healthy blood sugar targets) : goal is over 70%      Over 180 : goal is less than 20 %   Over 250: goal is  less than 5%

## 2025-05-22 NOTE — PROGRESS NOTES
Telehealth visit: Please note that the following visit was completed using two-way, real-time interactive audio and video communication.  Time Spent:  17 min       Leonidas Park is a 45 year old presenting today t for type 2 diabetes management.      PCP :  Marcos Bermudez MD    Initial consult with me  (A1C 9.7%)  Last Diabetes appointment with me  2025; changed ozempic prescription to Mounjaro   Tolerating well --> desires to increase dose since he is starting cravings again   Has lost weight. Self reported weight 310 lb       Most recent  A1C  6.1%  ( last A1C  7.9% )   Wearing dahlia 3 past week --> Past 7 days: 105 mg/dl     Diabetes History:  Diabetes ~    Patient has not had hospitalizations for blood sugar issues  denies any history of pancreatitis      Previous DM therapies:  Ozempic : change to mounjaro     Current DM Regimen:  Mounjaro 5 mg subcutaneous once weekly     Metformin  XR 500m tab twice  daily       HGBA1C:    Lab Results   Component Value Date    A1C 6.1 (H) 2025    A1C 7.9 (H) 02/10/2025    A1C 9.8 (H) 2024     (H) 2025       Lab Results   Component Value Date    CHOLEST 110 02/10/2025    CHOLEST 133 2024    TRIG 121 02/10/2025    TRIG 234 (H) 2024    HDL 34 (L) 02/10/2025    HDL 31 (L) 2024    LDL 54 02/10/2025    LDL 64 2024     Lab Results   Component Value Date    MICROALBCREA 12.0 02/10/2025    MICROALBCREA 6.8 2024      Lab Results   Component Value Date    CREATSERUM 0.89 02/10/2025    CREATSERUM 0.88 02/10/2025    EGFRCR 107 02/10/2025    EGFRCR 107 02/10/2025     Lab Results   Component Value Date    AST 33 02/10/2025    AST 33 02/10/2025    ALT 62 (H) 02/10/2025    ALT 63 (H) 02/10/2025       Lab Results   Component Value Date    TSH 1.057 02/10/2025    TSH 1.065 02/10/2025    T4F 1.1 2015     Component      Latest Ref Rng 2/10/2025   EMILIA-65      0.0 - 5.0 U/mL <5.0        Component      Latest Ref Rng  2/10/2025   Pancreatic Islet Cells      Neg:<1:1  Negative        Component      Latest Ref Rng 2/10/2025   C-PEPTIDE      1.1 - 4.4 ng/mL 5.9 (H)       2020 ct abd: LIVER:  Fatty infiltration of the liver without focal mass on this study without contrast       DM Complications:  Microvascular:   Neuropathy: yes  Retinopathy: no  Nephropathy: no    Macrovascular:  PVD: no   CAD: no  Stroke/CVA: yes  2015 occipital stroke        Modifying factors:  Medication adherence: yes   Recent steroids, illness or infections ( past 3m): no     Allergies: Dander and Seasonal    Past Medical History:    Acute, but ill-defined, cerebrovascular disease    Allergic rhinitis    Anxiety    Asthma (HCC)    Calculus of kidney    CPAP (continuous positive airway pressure) dependence    Depression    Diabetes (HCC)    GERD (gastroesophageal reflux disease)    Hay fever    Headache    Obesity    SIA (obstructive sleep apnea)    Sleep apnea    Stroke (HCC)    9/2015    Unspecified essential hypertension     Past Surgical History:   Procedure Laterality Date    Knee replacement surgery  1995, 1996    Knee surgery Right 01/01/1995    2 surgeries     Social History     Socioeconomic History    Marital status:    Tobacco Use    Smoking status: Passive Smoke Exposure - Never Smoker     Passive exposure: Past    Smokeless tobacco: Current     Types: Chew    Tobacco comments:     -chews tobacco daily   Vaping Use    Vaping status: Every Day   Substance and Sexual Activity    Alcohol use: Not Currently     Comment: occ    Drug use: Yes     Frequency: 7.0 times per week     Types: Cannabis     Comment: occ marijuana   Other Topics Concern    Caffeine Concern No    Stress Concern Yes    Weight Concern Yes    Special Diet Yes     Comment: Started eating better two weeks ago    Exercise No    Seat Belt No     Family History   Problem Relation Age of Onset    Heart Disease Father     Hypertension Father     Obesity Father     Hypertension Mother      Heart Disease Mother         CAD    Hypertension Sister     Obesity Sister     Hypertension Sister     Obesity Sister      Current Medication List:   Current Outpatient Medications   Medication Sig Dispense Refill    Tirzepatide (MOUNJARO) 7.5 MG/0.5ML Subcutaneous Solution Auto-injector Inject 7.5 mg into the skin once a week. 2 mL 1    amLODIPine Besylate-Valsartan 5-160 MG Oral Tab TAKE 2 TABLETS DAILY 180 tablet 0    triamterene-hydroCHLOROthiazide 37.5-25 MG Oral Cap TAKE 1 CAPSULE EVERY MORNING 90 capsule 0    montelukast 10 MG Oral Tab TAKE 1 TABLET DAILY 90 tablet 0    atorvastatin 20 MG Oral Tab TAKE 1 TABLET DAILY 90 tablet 0    Glucose Blood (ACCU-CHEK GUIDE TEST) In Vitro Strip       metFORMIN  MG Oral Tablet 24 Hr Two tablets twice daily 360 tablet 1    Continuous Glucose Sensor (FREESTYLE CRISS 3 PLUS SENSOR) Does not apply Misc 1 Device every 15 (fifteen) days. 2 each 0    OMEPRAZOLE 20 MG Oral Capsule Delayed Release TAKE 1 CAPSULE DAILY 90 capsule 3    clonazePAM 0.5 MG Oral Tab Take 1 tablet (0.5 mg total) by mouth 2 (two) times daily as needed for Anxiety.      lamoTRIgine 25 MG Oral Tab Take 1 tablet (25 mg total) by mouth daily.      ondansetron 4 MG Oral Tablet Dispersible Take 1 tablet (4 mg total) by mouth daily.      Albuterol Sulfate HFA (PROAIR HFA) 108 (90 Base) MCG/ACT Inhalation Aero Soln Inhale 2 puffs into the lungs every 6 (six) hours as needed for Wheezing. 3 each 1    busPIRone HCl 15 MG Oral Tab Take 1 tablet (15 mg total) by mouth 2 (two) times daily.      CITALOPRAM HYDROBROMIDE 20 MG Oral Tab TAKE 1 TABLET BY MOUTH DAILY 90 tablet 0    aspirin 325 MG Oral Tab Take 1 tablet (325 mg total) by mouth daily.             DM associated review of  symptoms:   Endocrine: Polyuria, polyphagia, polydipsia: no  Neurological: Paresthesias: no  HEENT: Blurred vision: no  Skin: no rash or wounds  Hematological: Hypoglycemia: no      Review of Systems     LUNGS: denies shortness of  breath   CARDIOVASCULAR: denies chest pain  GI: denies abdominal pain,nausea , constipation or diarrhea       Physical exam:  There were no vitals taken for this visit.  There is no height or weight on file to calculate BMI.    Physical Exam     Constitutional: Normal appearance   Cardiovascular: Not assessed today   Pulmonary/Chest: Effort normal  Neurological: Alert and oriented .   Psychiatric: Normal mood and affect.       Assessment/Plan:    Hypertension  CPM    As he continues w weight loss, prescriptions may be decreased.       Fatty liver  ALT increased -( CT abd revealed  fatty liver)   Encouraged continue efforts w weight loss     Dyslipidemia   Cholesterol: 110, done on 2/10/2025.  HDL Cholesterol: 34, done on 2/10/2025.  TriGlycerides 121, done on 2/10/2025.  LDL Cholesterol: 54, done on 2/10/2025.    Needs ongoing monitoring   Continue atorvastatin prescription     Type 2 diabetes mellitus with hyperglycemia, without long-term current use of insulin ([facility])  A1C: 6.1% ( last A1C  7.9% 9.8% 11.3%)   Reported weight: 310 lb ( Last recorded weigh: 332 lb (  self reported weight: 345 lb)     Diabetes control is improving    Needs ongoing management and evaluation  given the chronicity of Diabetes, ongoing medication monitoring and risk for complications     Reviewed with patient health impact associated with high glucose trends and the importance of better glucose control to prevent onset /progression of DM complications.     Use  Osman 3 + continuous glucose sensor -as needed to monitor trends/patterns   Increase Mounjaro  5mg --> 7.5mg once weekly  ~will continue to dose increase based on glycemic response, tolerance and weight impact     Decrease Metformin XR 500m tab twice daily --> 1 tab daily     In past discussed using ASCVD agents given hx stroke. Declined adding SGLT2i prescription. Changed Ozempic to Mounjaro for weight loss - Tirzepatide has current studies for ASCVD benefit        Reviewed  clinical significance of A1c, adverse effects of suboptimal glucose control, and goals of therapy   Reviewed the A1C test, what the value reflects and the goal for the patient.   Reminded pt on A1C and blood sugar targets (Fasting < 130 and post prandial <180 ) and complications associated with hyperglycemia and uncontrolled DM (on AVS)   Recommended SMBG 2- x daily if not using CGM   Continue with lifestyle modifications since they have positive impact on diabetes/blood sugars/health (portion control, physical activity, weight loss)   Reinforced timing and adherence with medication, self-monitoring of blood glucose and routine follow up    The patient is asked to return in 6 m but recommended to contact DM clinic sooner if questions or concerns.    The patient indicates understanding of these issues and agrees to the plan.      Orders Placed This Encounter    Tirzepatide (MOUNJARO) 7.5 MG/0.5ML Subcutaneous Solution Auto-injector     Sig: Inject 7.5 mg into the skin once a week.     Dispense:  2 mL     Refill:  1     Dx e11.9       Diabetes complications & risks surveillance:   A1C/Blood pressure: as reported    Last dilated eye exam: No data recorded Exam shows retinopathy? No data recorded  Last diabetic foot exam: No data recorded  Nephropathy screening:    Continue ace /arb rx.    Lab Results   Component Value Date    EGFRCR 107 02/10/2025    MICROALBCREA 12.0 02/10/2025     LIPID screening:    Lab Results   Component Value Date    CHOLEST 110 02/10/2025    LDL 54 02/10/2025    TRIG 121 02/10/2025    HDL 34 (L) 02/10/2025    FASTING Yes 02/10/2025     Cholesterol Lowering Medications            atorvastatin 20 MG Oral Tab               Note to patient: The 21 Century Cures Act makes medical notes like these available to patients in the interest of transparency. However, be advised this is a medical document. It is intended as peer to peer communication. It is written in medical language and may  contain abbreviations or verbiage that are unfamiliar. It may appear blunt or direct. Medical documents are intended to carry relevant information, facts as evident, and the clinical opinion of the practitioner.       This has been done in good dariusz to provide continuity of care in the best interest of the provider-patient relationship, due to the on-going public health crisis/national emergency and because of restrictions of visitation.  There are limitations of this visit as no or only very limited physical exam could be performed.  Every conscious effort was taken to allow for sufficient and adequate time.  This billing visit was spent on reviewing blood sugar trends, DM related labs, medications and decision making.  Appropriate medical decision-making and tests are ordered as detailed in the plan of care above.      Leonidas Park understands phone or video evaluation is not a substitute for face-to-face examination or emergency care. Patient advised to go to ER or call 911 for worsening symptoms or acute distress.     Ana Luisa Cornelius, APRN

## 2025-06-05 ENCOUNTER — DIABETIC EDUCATION (OUTPATIENT)
Facility: CLINIC | Age: 45
End: 2025-06-05
Payer: COMMERCIAL

## 2025-06-05 VITALS — WEIGHT: 300 LBS | BODY MASS INDEX: 42 KG/M2

## 2025-06-05 DIAGNOSIS — E11.65 TYPE 2 DIABETES MELLITUS WITH HYPERGLYCEMIA, WITHOUT LONG-TERM CURRENT USE OF INSULIN (HCC): Primary | ICD-10-CM

## 2025-06-05 NOTE — PROGRESS NOTES
Leonidas Park  (5/4/1980) attended Diabetes Education.    Referring Provider: LEIGHA Ruiz       Date: 6/5/2025  Start time: 12:15pm End time: 12:30pm  _______________________________________________     Mr. Park is a 45 year old male who presents today with type II diabetes.        Reason for visit: follow up on weight loss goal        Prev diab edu? yes            Assessment:     Wt Readings from Last 6 Encounters:   06/05/25 300 lb   03/13/25 (!) 332 lb   03/12/24 (!) 354 lb 9.6 oz   02/28/24 (!) 355 lb   02/20/24 (!) 366 lb   12/20/22 (!) 370 lb            Lab Results   Component Value Date    A1C 6.1 (H) 05/16/2025    A1C 7.9 (H) 02/10/2025    A1C 9.8 (H) 12/17/2024    A1C 11.3 (H) 01/24/2024    A1C 13.1 (H) 08/12/2021        Medical History:   Past Medical History:    Acute, but ill-defined, cerebrovascular disease    Allergic rhinitis    Anxiety    Asthma (HCC)    Calculus of kidney    CPAP (continuous positive airway pressure) dependence    Depression    Diabetes (HCC)    GERD (gastroesophageal reflux disease)    Hay fever    Headache    Obesity    SIA (obstructive sleep apnea)    Sleep apnea    Stroke (HCC)    9/2015    Unspecified essential hypertension         Medications:     Current DM management:     Oral:  metformin xr 500 mg daily      Injectable:  7.5mg mounjaro (some constipation and encouraged fiber increase in diet)              Injection sites: Legs  Lipodystrophy: No    Taking correctly: Yes    Monitoring Blood Glucose:     Continuous Glucose Monitor:  Used dahlia 3+ for two weeks, has one other he can use in future                  Healthy Eating & Exercise:     Diet History: Low carb and whole wheat, eating low/no carb, yogurt, smoothie with spinach  Usually eats 3 meals/day and 0 snacks/day.      TYPICAL  FOOD  NOTES   Breakfast  smoothie with spinach almond milk, blueberries, flax seed, protein powder, yogurt whole chobani yogurt, banana    Or egg with cheese         600 kcal, 29 fat,  41 protein, 70 carb (40 sugar), 11 fiber   Lunch  tuna with church on keto bread,        Dinner  raul rice with chicken with cheese crust    Tacos with keto tortillas    hamburger    Low carb pasta with fiber raos pasta sauce with chicken     Corn tortillas with carne asada   Struggles with eating vegetables   Snacks cheese sticks, almonds, pb and celery, apple         Beverages  water with kamaljit, tea green, lemon with water  70-80 oz 64 oz          Eating out  Used to do frequently but has lowered this recently     Less than used to            Instructed on basic diet guidelines including aiming for 3 meals/day, balancing each meal with variety of nutrients, using plate method as a model for meals - goal of 1/2 plate non-starchy vegetables, 1/4 plate lean proteins, 1/4 plate carbohydrates and modest portions of fruit, yogurt, milk if desired.    We reviewed impact of carbohydrates, fiber, protein, and fat on glucose levels and trends. We discussed balancing meals and snacks with a combination of carbohydrates, proteins, and fiber to help stabilize glucose levels.     We discussed aiming for consistent meal times/carb amounts can help us determine if medications are correct. Reviewed carbohydrate counting, goals for meals (60-75 grams per meal) and snacks (30 grams per snack). Reviewed that carb counting consistently for a week or so can help regain control over glucose.    Carbohydrate counting evin recommendations provided. Carb \"Cheat Sheet\" provided for quick references.     Physical Activity:  Currently exercising: Yes:    Walkingwalking recently 15 days in last 6 weeks (6-8000 steps at track practice)    Sleep Patterns: cpap machine, usually good 8 hours   Stress: is high    Lifestyle & Healthy Coping:     Discussed healthy coping options, resources available, and diabetes distress. Discussed signs of diabetes distress and actions to take at that time. Discussed ways to prevent diabetes distress.        Education:     Diabetes Overview  Reviewed diabetes pathophysiology.  Discussed current A1c result and goals.   Discussed benefits of blood glucose control and blood glucose targets.  Reviewed signs, symptoms, prevention, and treatment of hyperglycemia and hypoglycemia.    Healthy Eating  Reviewed basic nutrition concepts for diabetes management.  Reviewed food groups and impact on blood glucose levels.  Discussed MyPlate Method of balancing diet.  Reviewed carbohydrate counting and label reading.    Being Active  Benefits and effect of activity on blood glucose discussed.  Reviewed when to call MD and benefits of goal setting.    Monitoring  Reinforced glucose monitoring schedules:   Discussed use of personal CGM and benefits.    Taking Medication  Reviewed medication use, action, timing, and side effects.   Injectables: Site selection, rotation, action, timing reviewed.     Reducing Risk  Reviewed overview of complications including: CV health    Healthy Coping  Family involvement/support encouraged  Depression and diabetes reviewed.      Goals:     Practice healthful eating patterns, emphasizing a variety of nutrient dense foods in appropriate portion sizes.    Monitor carbohydrate intake with the MyPlate method.  Take medications as prescribed.  Increase or continue physical activity of at least 150 mins, over at least 3 days a week or per doctors recommendations.   Achieve and maintain weight loss goals.   Improve glycemic control working towards an A1c of 7.0% or less  Using CGM for detailed blood glucose monitoring and management.  Strive for Time In Range of 70% or greater.       Blood Glucose Goals  Blood sugar goals: less than 130 mg/dl in the morning (fasting) and less than 180 mg/dl 2 hours after meals.  Keep glucose tabs or fast acting carbohydrates with you for treatment of lows; for blood glucose levels less than 70 mg/dl, take 15 grams of fast acting carbohydrates (3-4 glucose tabs, 4 ounces of  juice, or as discussed). Retest in 15 min. If not above 70 mg/dl, retreat.    Recommendations:     Patient goals:  I would recommend adding fiber supplement to protein shake  Great job with the nutrition and exercise changes you have made!    Continue to use the Plate Method as a model for your meals:  1/2 of your plate is non-starchy vegetables, 1/4 of your plate is lean protein, 1/4 of your plate is starchy or carbohydrate foods     Check your blood sugars via CGM      Blood glucose guidelines/goals:     ADA (American Diabetes Association):  A1c:  7% or less  Fasting Blood Glucose (before you eat breakfast):    2 Hours After a Meal:  Less than 180     Your doctor may give more specific goals for you.     Recommend follow up with diabetes educator in three months. Bring your glucose log book with you to your visits.     Follow up:   Future Appointments   Date Time Provider Department Center   11/19/2025  3:15 PM Ana Luisa Cornelius APRN [id number]             Patient verbalized understanding and has no further questions at this time.    Julia Che, SHAMAR, , LD, CDCES

## 2025-06-05 NOTE — PATIENT INSTRUCTIONS
It was great meeting with you today! I am so grateful we were able to meet and talk about your diabetes!  I would recommend adding fiber supplement to protein shake  Great job with the nutrition and exercise changes you have made!    Blood Glucose Goals  Between  mg/dl in the morning  Less than 180 mg/dl 2 hours after meals.  (You and your doctor might have discussed more specific goals)    Treating low blood sugars and high blood sugars  Always check your blood sugar if you feel any symptoms of a low blood sugar.      If you have a low blood sugar (less than 70 mg/dL) follow the \"Rule of 15\" to treat it:  1.) Take 15 grams of a quick acting carbohydrate (1 tablespoon of honey, 3-4 glucose tablets, 1/2 cup fruit juice, 1/2 can regular soda, or to-go pouch of applesauce). Only eat ONE 15g SERVING of a quick acting carbohydrate.  2.) Then check your blood sugar again after 15 minutes of drinking or eating the quick acting carbohydrate to be sure your blood sugar is above 70 mg/dL.   3.) If your blood sugar is still less than 70 mg/dL, repeat treatment of 15 grams of a quick acting carbohydrate (1 tablespoon of honey, 3-4 glucose tablets, 1/2 cup fruit juice, 1/2 can regular soda, or to-go pouch  of applesauce).  4.) If your next meal is more than one hour away, eat a small snack. Try to have some protein and complex carbohydrate (peanut butter crackers, pretzels and cheese, etc).   5.) If you are not sure what caused your low blood sugar, call your healthcare provider.  6.) Always check your blood sugar before you drive.     _______________________________________________       To treat a low, we recommend you carry with you easy, pre-portioned treatment for low blood sugars that are 15G of carbs:   - Children sized squeeze pouch applesauce (high fiber + carbs help prevent too high of a spike)  - Small children's sized juicebox- 15g carb --> 4oz juice box  - Glucose tablets from [employer], you can find them  near diabetes supplies --> Note, you will need to eat 3-4 tablets to get to 15g of carbs  - Children sized fruit snack pack- look for one with 15 grams of total carbohydrate     AVOID complex carbs, or foods that contain fats along with carbs (like chocolate) can slow the absorption of glucose and should NOT be used to treat an emergency low.    You are doing amazing! Keep up the great work!    Julia Che, RD, , LD, CDCES(she/her/hers)  Diabetes Educator  [facility], [place]  [email or web address]  [phone] phone  [phone] Fax

## 2025-06-06 ENCOUNTER — TELEPHONE (OUTPATIENT)
Dept: INTERNAL MEDICINE CLINIC | Facility: CLINIC | Age: 45
End: 2025-06-06

## 2025-06-06 NOTE — TELEPHONE ENCOUNTER
Pt called stating he needs a new c-pap machine as it has begun to make a terrible noise pt mentioned this occurred to him some years ago. He is aware that he needs to est care w/ a new provider as he is a previous  pt however, he will be leaving to [facility] on 6/16 and needs the c-pap for his trip.    Pt mentioned he has been using the c-pap for the past 5-6 years. Also had a sleep study about a year ago.    Pt prefers a call back.

## 2025-06-09 NOTE — TELEPHONE ENCOUNTER
RP:  pt, has not established care with another provider in office (pt lives in [place]). Requesting for CPAP order. Last sleep study was in 2019. OK to re-order or recommend patient be seen in OV sooner?

## 2025-06-10 NOTE — TELEPHONE ENCOUNTER
Unless he has a copy of a more recent sleep study he will need to do an updated sleep study generally before any supply company will give a new machine.    He is welcome to establish with one of the other providers here but if he decides to establish somewhere closer to home he could try our [facility] or [facility] in [place] as they are similar chart to us

## 2025-06-10 NOTE — TELEPHONE ENCOUNTER
Spoke with patient to inform of process needed to place order for new CPAP machine. Pt would need to establish care with PCP to provide order and complete a new sleep study (pt clarified that the last sleep study he had done was in 2019) so we can send request to a vendor. No sooner appointments are available for pt to establish care with a PCP and complete a sleep study prior to leaving for [place] on 6/16. Pt wondering if it's possible to have a loaner CPAP machine as he has trouble sleeping at night (it's been about a week without his CPAP machine since it started it making noises and doesn't work) and doesn't want to run into any medical issues while traveling.     RP: Please advise if possible to request for a loaner CPAP machine? This RN advised pt that he has to establish care and complete a sleep study prior to order being placed.

## 2025-06-11 NOTE — TELEPHONE ENCOUNTER
I'm not aware of how patients can get loaner machines/have never ordered one before.  He could try and check with his previous CPAP supplier to see if they have that option

## 2025-06-30 ENCOUNTER — TELEPHONE (OUTPATIENT)
Dept: INTERNAL MEDICINE CLINIC | Facility: CLINIC | Age: 45
End: 2025-06-30

## 2025-06-30 DIAGNOSIS — E11.65 TYPE 2 DIABETES MELLITUS WITH HYPERGLYCEMIA, WITHOUT LONG-TERM CURRENT USE OF INSULIN (HCC): ICD-10-CM

## 2025-06-30 DIAGNOSIS — I10 ESSENTIAL HYPERTENSION: Chronic | ICD-10-CM

## 2025-07-01 RX ORDER — METFORMIN HYDROCHLORIDE 500 MG/1
1000 TABLET, EXTENDED RELEASE ORAL 2 TIMES DAILY
Qty: 360 TABLET | Refills: 3 | Status: SHIPPED | OUTPATIENT
Start: 2025-07-01

## 2025-07-01 RX ORDER — MONTELUKAST SODIUM 10 MG/1
10 TABLET ORAL DAILY
Qty: 90 TABLET | Refills: 0 | Status: SHIPPED | OUTPATIENT
Start: 2025-07-01

## 2025-07-01 RX ORDER — AMLODIPINE AND VALSARTAN 5; 160 MG/1; MG/1
2 TABLET ORAL DAILY
Qty: 180 TABLET | Refills: 0 | Status: SHIPPED | OUTPATIENT
Start: 2025-07-01

## 2025-07-01 RX ORDER — TRIAMTERENE AND HYDROCHLOROTHIAZIDE 37.5; 25 MG/1; MG/1
1 CAPSULE ORAL EVERY MORNING
Qty: 90 CAPSULE | Refills: 0 | Status: SHIPPED | OUTPATIENT
Start: 2025-07-01

## 2025-07-01 NOTE — TELEPHONE ENCOUNTER
Requested Prescriptions     Pending Prescriptions Disp Refills    METFORMIN  MG Oral Tablet 24 Hr [Pharmacy Med Name: METFORMIN HCL ER TABS 500MG] 360 tablet 3     Sig: TAKE 2 TABLETS TWICE DAILY     Future Appointments   Date Time Provider Department Center   11/19/2025  3:15 PM Ana Luisa Cornelius APRN [id number]       Last A1c value was 6.1% done 5/16/2025.  Refill 05/22/25 [facility]   LOV 05/22/25 [facility]

## 2025-08-22 ENCOUNTER — PATIENT MESSAGE (OUTPATIENT)
Facility: CLINIC | Age: 45
End: 2025-08-22

## 2025-08-22 DIAGNOSIS — E11.65 TYPE 2 DIABETES MELLITUS WITH HYPERGLYCEMIA, WITHOUT LONG-TERM CURRENT USE OF INSULIN (HCC): Primary | ICD-10-CM

## 2025-08-22 RX ORDER — TIRZEPATIDE 7.5 MG/.5ML
7.5 INJECTION, SOLUTION SUBCUTANEOUS WEEKLY
Qty: 3 ML | Refills: 1 | Status: CANCELLED | OUTPATIENT
Start: 2025-08-22

## 2025-08-22 RX ORDER — TIRZEPATIDE 10 MG/.5ML
10 INJECTION, SOLUTION SUBCUTANEOUS WEEKLY
Qty: 2 ML | Refills: 2 | Status: SHIPPED | OUTPATIENT
Start: 2025-08-22

## (undated) DIAGNOSIS — E11.9 TYPE 2 DIABETES MELLITUS WITHOUT COMPLICATION (HCC): ICD-10-CM

## (undated) DIAGNOSIS — I10 ESSENTIAL HYPERTENSION: Chronic | ICD-10-CM

## (undated) NOTE — LETTER
Date & Time: 3/21/2018, 11:55 AM  Patient: Gómez Burns  Attending Provider:    Sincerely,    Belén Osei MD         To Whom It May Concern:    Rosalinda Alvarez was seen and treated in our department on 3/21/2018. He can return to work.     If you have a

## (undated) NOTE — LETTER
Date & Time: 2/28/2024, 11:43 AM  Patient: Leonidas Park  Encounter Provider(s):    Rbeeca Stock APRN       To Whom It May Concern:    Leonidas Park was seen and treated in our department on 2/28/2024. He should not return to work until fever free for 24 hours without medications .    If you have any questions or concerns, please do not hesitate to call.        _____S. Montrell FNP-C_____________  Physician/APC Signature

## (undated) NOTE — LETTER
09/17/19        Christy Oneill  [address] [place] [address] [address] [address]      Dear [facility],    [address] [facility] records indicate that you have outstanding lab work and or testing that was ordered for you and has not yet been completed: CT Calcium Scoring - Please Mikala Strickland MD

## (undated) NOTE — ED AVS SNAPSHOT
[facility] Emergency Department    [place]  [address] [address]    Phone:  [phone]    Fax:  [phone]           Wu Knoxsue   MRN: [id number]    Department:  [facility] Emergency Department   Date of Visit:  6/19/2 IF THERE IS ANY CHANGE OR WORSENING OF YOUR CONDITION, CALL YOUR PRIMARY CARE PHYSICIAN AT ONCE OR RETURN IMMEDIATELY TO THE EMERGENCY DEPARTMENT.     If you have been prescribed any medication(s), please fill your prescription right away and begin taking t

## (undated) NOTE — LETTER
03/26/20        Terrell Cohen  [address] [address]      Dear Janusz Zhou,    [address] [facility] records indicate that you have outstanding lab work and or testing that was ordered for you and has not yet been completed:  Orders Placed This Encount

## (undated) NOTE — LETTER
Date & Time: 11/30/2022, 9:21 AM  Patient: Matt Ludwig  Encounter Provider(s): Maxine Garcia MD       To Whom It May Concern:    Mendez Fabian was seen and treated in our department on 11/30/2022. He should not return to work until 12/1/2022.     If you have any questions or concerns, please do not hesitate to call.        _____________________________  Physician/APC Signature

## (undated) NOTE — MR AVS SNAPSHOT
[place]  [address] [facility]  [address] [facility] [address] [phone]               Thank you for choosing us for your health care visit with Lonnie Lujan MD.  We are glad to serve you and happy to provide you with this troncoso tests may be performed at a later time. Home Care:  1. You may need to stay in bed the first few days.  But, as soon as possible, begin sitting or walking to avoid problems with prolonged bed rest (muscle weakness, worsening back stiffness and pain, blood Patient informed to call me in case symptoms get worse if there is no better may consider physical therapy/MRI lumbar       Allergies as of Jun 22, 2017     Dander     Cat dander    Seasonal                 Today's Vital Signs     BP Pulse Temp Height Azul Jaems - Diclofenac Sodium 50 MG Tbec  - oxyCODONE-acetaminophen  MG Tabs            MyChart     Call the [employer] for assistance with your inactive [employer] account    If you have questions, you can call [phone] to talk to our [facility] Staff.

## (undated) NOTE — ED AVS SNAPSHOT
[facility] Emergency Department    [facility]  [address] [address]    Phone:  [phone]    Fax:  [phone]           Gómez Grant   MRN: [id number]    Department:  [facility] Emergency Department   Date of Visit:  6/19/2 [phone]       To Check ER Wait Times:  TEXT 'ERwait' to 51899      Click [email or web address]      Or call [phone]    If you have any problems with your follow-up, please call our  at [phone]    Nba mcgowana con I have read and understand the instructions given to me by my caregivers. 24-Hour Pharmacies        Pharmacy Address Phone Number   [address] ([address]) [place] Dictated by: Amandeep Rai MD on 6/19/2017 at 10:49       Approved by: Amandeep Rai MD              Narrative:    PROCEDURE:  XR LUMBAR SPINE (MIN 2 VIEWS) (CPT=72100)     TECHNIQUE:  AP, lateral, and coned down L5-S1 views were obtained.      CO

## (undated) NOTE — LETTER
07/07/21        Owen Medina  [address] [address]      Dear Noem[place] Church,    [address] [facility] records indicate that you have outstanding lab work and or testing that was ordered for you and has not yet been completed:  Orders Placed This Encount